# Patient Record
Sex: MALE | Race: WHITE | Employment: OTHER | URBAN - METROPOLITAN AREA
[De-identification: names, ages, dates, MRNs, and addresses within clinical notes are randomized per-mention and may not be internally consistent; named-entity substitution may affect disease eponyms.]

---

## 2017-01-16 LAB
AVERAGE GLUCOSE: NORMAL
HBA1C MFR BLD: 5.9 %

## 2018-05-23 ENCOUNTER — OFFICE VISIT (OUTPATIENT)
Dept: CARDIOLOGY CLINIC | Age: 78
End: 2018-05-23
Payer: MEDICARE

## 2018-05-23 VITALS
HEIGHT: 72 IN | RESPIRATION RATE: 18 BRPM | SYSTOLIC BLOOD PRESSURE: 110 MMHG | DIASTOLIC BLOOD PRESSURE: 70 MMHG | HEART RATE: 48 BPM | BODY MASS INDEX: 26.14 KG/M2 | WEIGHT: 193 LBS

## 2018-05-23 DIAGNOSIS — I25.10 CAD IN NATIVE ARTERY: Primary | Chronic | ICD-10-CM

## 2018-05-23 PROCEDURE — G8417 CALC BMI ABV UP PARAM F/U: HCPCS | Performed by: INTERNAL MEDICINE

## 2018-05-23 PROCEDURE — 4004F PT TOBACCO SCREEN RCVD TLK: CPT | Performed by: INTERNAL MEDICINE

## 2018-05-23 PROCEDURE — 93000 ELECTROCARDIOGRAM COMPLETE: CPT | Performed by: INTERNAL MEDICINE

## 2018-05-23 PROCEDURE — 4040F PNEUMOC VAC/ADMIN/RCVD: CPT | Performed by: INTERNAL MEDICINE

## 2018-05-23 PROCEDURE — G8427 DOCREV CUR MEDS BY ELIG CLIN: HCPCS | Performed by: INTERNAL MEDICINE

## 2018-05-23 PROCEDURE — 1123F ACP DISCUSS/DSCN MKR DOCD: CPT | Performed by: INTERNAL MEDICINE

## 2018-05-23 PROCEDURE — 99214 OFFICE O/P EST MOD 30 MIN: CPT | Performed by: INTERNAL MEDICINE

## 2018-05-23 PROCEDURE — G8599 NO ASA/ANTIPLAT THER USE RNG: HCPCS | Performed by: INTERNAL MEDICINE

## 2018-05-23 RX ORDER — DOCUSATE SODIUM 100 MG/1
100 CAPSULE, LIQUID FILLED ORAL 2 TIMES DAILY
COMMUNITY

## 2018-10-30 LAB
CHOLESTEROL, TOTAL: 134 MG/DL
CHOLESTEROL/HDL RATIO: 2.6
HDLC SERPL-MCNC: 52 MG/DL (ref 35–70)
LDL CHOLESTEROL CALCULATED: 63 MG/DL (ref 0–160)
TRIGL SERPL-MCNC: 103 MG/DL
VLDLC SERPL CALC-MCNC: NORMAL MG/DL

## 2018-12-20 ENCOUNTER — OFFICE VISIT (OUTPATIENT)
Dept: CARDIOLOGY CLINIC | Age: 78
End: 2018-12-20
Payer: MEDICARE

## 2018-12-20 VITALS
WEIGHT: 186.6 LBS | HEIGHT: 72 IN | HEART RATE: 56 BPM | BODY MASS INDEX: 25.27 KG/M2 | SYSTOLIC BLOOD PRESSURE: 124 MMHG | DIASTOLIC BLOOD PRESSURE: 68 MMHG | RESPIRATION RATE: 20 BRPM

## 2018-12-20 DIAGNOSIS — I48.20 CHRONIC ATRIAL FIBRILLATION (HCC): Chronic | ICD-10-CM

## 2018-12-20 DIAGNOSIS — I25.10 CAD IN NATIVE ARTERY: Primary | Chronic | ICD-10-CM

## 2018-12-20 PROCEDURE — G8417 CALC BMI ABV UP PARAM F/U: HCPCS | Performed by: INTERNAL MEDICINE

## 2018-12-20 PROCEDURE — G8427 DOCREV CUR MEDS BY ELIG CLIN: HCPCS | Performed by: INTERNAL MEDICINE

## 2018-12-20 PROCEDURE — 1101F PT FALLS ASSESS-DOCD LE1/YR: CPT | Performed by: INTERNAL MEDICINE

## 2018-12-20 PROCEDURE — G8484 FLU IMMUNIZE NO ADMIN: HCPCS | Performed by: INTERNAL MEDICINE

## 2018-12-20 PROCEDURE — 93000 ELECTROCARDIOGRAM COMPLETE: CPT | Performed by: INTERNAL MEDICINE

## 2018-12-20 PROCEDURE — 1036F TOBACCO NON-USER: CPT | Performed by: INTERNAL MEDICINE

## 2018-12-20 PROCEDURE — G8599 NO ASA/ANTIPLAT THER USE RNG: HCPCS | Performed by: INTERNAL MEDICINE

## 2018-12-20 PROCEDURE — 4040F PNEUMOC VAC/ADMIN/RCVD: CPT | Performed by: INTERNAL MEDICINE

## 2018-12-20 PROCEDURE — 99214 OFFICE O/P EST MOD 30 MIN: CPT | Performed by: INTERNAL MEDICINE

## 2018-12-20 PROCEDURE — 1123F ACP DISCUSS/DSCN MKR DOCD: CPT | Performed by: INTERNAL MEDICINE

## 2018-12-20 NOTE — PROGRESS NOTES
Patient is a 77 y.o. male of [address] [place] [address] seen in follow up. Chief complaint: Hx of CABG    HPI: No SOB. No CP. Patient Active Problem List   Diagnosis    Chronic atrial fibrillation ([address]    CAD in native artery    Anemia    Mixed hyperlipidemia    Psoriasis    Acute respiratory failure with hypoxia (HCC)    Hypertensive urgency, malignant    HTN (hypertension), benign    Nontraumatic hemorrhage of cerebral hemisphere (HCC)       Allergies   Allergen Reactions    Pcn [Penicillins]        Current Outpatient Prescriptions   Medication Sig Dispense Refill    Coenzyme Q10 (CO Q 10) 100 MG CAPS Take by mouth      docusate sodium (RA COL-RITE) 100 MG capsule Take 100 mg by mouth 2 times daily      tamsulosin (FLOMAX) 0.4 MG capsule Take 0.4 mg by mouth daily   0    sertraline (ZOLOFT) 50 MG tablet Take 50 mg by mouth daily   0    hydrALAZINE (APRESOLINE) 10 MG tablet Take 10 mg by mouth 2 times daily   0    levETIRAcetam (KEPPRA) 500 MG tablet Take 500 mg by mouth 2 times daily   0    atorvastatin (LIPITOR) 40 MG tablet 40 mg by Other route Indications: M-W-f only       ferrous sulfate 325 (65 FE) MG tablet Take 325 mg by mouth daily (with breakfast)      amLODIPine (NORVASC) 10 MG tablet 1 tablet by PEG Tube route daily 30 tablet 3     No current facility-administered medications for this visit. Review of systems:   Heart: as above   Lungs: as above   Eyes: denies changes in vision or discharge. Ears: denies changes in hearing or pain. Nose: denies epistaxis or masses   Throat: denies sore throat or trouble swallowing. Neuro: denies numbness, tingling, tremors. Skin: denies rashes or itching. : denies hematuria, dysuria   GI: denies vomiting, diarrhea   Psych: denies mood changed, anxiety, depression.     Physical Exam   /68   Pulse 56   Resp 20   Ht 6' (1.829 m)   Wt 186 lb 9.6 oz (84.6 kg)   BMI 25.31 kg/m²   Constitutional: Oriented to person, place, and time. No distress. Well developed. Head: Normocephalic and atraumatic. Neck: Neck supple. No hepatojugular reflux. No JVD present. Carotid bruit is not present. No tracheal deviation present. No thyromegaly present. Cardiovascular: Normal rate, regular rhythm, TE 2/6  Pulmonary: Breath sounds normal. No respiratory distress. No wheezes. No rales. Chest: Effort normal. No tenderness. Abdominal: Soft. Bowel sounds are normal. No distension or mass. No tenderness, rebound or guarding. Musculoskeletal: . No tenderness. No clubbing or cyanosis. Extremitites: Intact distal pulses. No edema  Neurological: Alert and oriented to person, place, and time. Skin: Skin is warm and dry. No rash noted. Not diaphoretic. No erythema. Psychiatric: Normal mood and affect. Behavior is normal.   Lymphadenopathy: No cervical adenopathy. No groin adenopathy. EKG: Sinus bradycardia. 1st degree AVB    Echo Summary 11/7/17:   Normal left ventricular systolic function.   Ejection fraction is visually estimated at 65%.  Mild concentric left ventricular hypertrophy.   Normal right ventricular size and function (TAPSE 1.8 cm).   Moderately dilated left atrium by volume index.   Mild mitral regurgitation.   Moderate aortic stenosis (AV area 1.4 cm2).  Mild tricuspid regurgitation.   PASP is estimated at 27 mmHg (normal estimated PASP). ASSESSMENT & PLAN:    Patient Active Problem List   Diagnosis    Chronic atrial fibrillation ([address]    CAD in native artery    Anemia    Mixed hyperlipidemia    Psoriasis    Acute respiratory failure with hypoxia (HCC)    Hypertensive urgency, malignant    HTN (hypertension), benign    Nontraumatic hemorrhage of cerebral hemisphere ([address]     1. CAD/Post CABG:   Cath revealing of of multivessel CAD. Underwent CABG 5/14. Continue atorvastatin. No BB due to rest bradycardia. No ASA due to GIB and hemorrhagic stroke history. Consider restarting ASA.      2. Afib/Bradycardia:

## 2019-02-07 LAB
CREATININE: 1.8 MG/DL
POTASSIUM (K+): 4.6

## 2019-05-13 RX ORDER — TAMSULOSIN HYDROCHLORIDE 0.4 MG/1
CAPSULE ORAL
Qty: 180 CAPSULE | Refills: 1 | OUTPATIENT
Start: 2019-05-13

## 2019-05-14 VITALS
HEIGHT: 72 IN | OXYGEN SATURATION: 98 % | WEIGHT: 185.5 LBS | HEART RATE: 57 BPM | SYSTOLIC BLOOD PRESSURE: 138 MMHG | BODY MASS INDEX: 25.12 KG/M2 | DIASTOLIC BLOOD PRESSURE: 76 MMHG

## 2019-05-14 RX ORDER — AMPICILLIN TRIHYDRATE 250 MG
600 CAPSULE ORAL 2 TIMES DAILY
COMMUNITY
End: 2019-09-10

## 2019-05-14 ASSESSMENT — ENCOUNTER SYMPTOMS
ABDOMINAL PAIN: 0
ALLERGIC/IMMUNOLOGIC NEGATIVE: 1
SHORTNESS OF BREATH: 0
CHEST TIGHTNESS: 0

## 2019-05-14 NOTE — PROGRESS NOTES
05/15/19     West YANCEY Shirazenhozoran     1940     77 y.o. Chief Complaint   Patient presents with    Coronary Artery Disease    Hyperlipidemia    Hypertension        Hypertension Compliance Reviewed. No medication side effects noted. Denies associated dyspnea, edema, fatigue, orhtopenia, chest pain and palpitations. Pt continues to have sensitive skin right side of the body and poor balance. Pt states sx improve after working out. Review of Systems   Constitutional: Negative for activity change and appetite change. HENT: Negative for congestion. Eyes: Negative for visual disturbance. Respiratory: Negative for chest tightness and shortness of breath. Cardiovascular: Negative for chest pain, palpitations and leg swelling. Gastrointestinal: Negative for abdominal pain. Endocrine: Negative for cold intolerance and heat intolerance. Genitourinary: Negative for difficulty urinating. Musculoskeletal: Negative for arthralgias. Skin: Negative. Negative for rash. Allergic/Immunologic: Negative. Neurological: Negative for dizziness, syncope and light-headedness. Rt sided weaknes ,lack of coordination   Hematological: Negative. Negative for adenopathy. Psychiatric/Behavioral: Negative. No problem-specific Assessment & Plan notes found for this encounter.        Current Outpatient Medications   Medication Sig Dispense Refill    atorvastatin (LIPITOR) 40 MG tablet Take 1 tablet by mouth daily Indications: M-W-f only 90 tablet 1    amLODIPine (NORVASC) 10 MG tablet 1 tablet by PEG Tube route daily 90 tablet 1    hydrALAZINE (APRESOLINE) 10 MG tablet Take 1 tablet by mouth 2 times daily 180 tablet 1    sertraline (ZOLOFT) 50 MG tablet Take 1 tablet by mouth daily 90 tablet 1    Red Yeast Rice 600 MG CAPS Take 600 mg by mouth 2 times daily      Coenzyme Q10 (CO Q 10) 100 MG CAPS Take by mouth      docusate sodium (RA COL-RITE) 100 MG capsule Take 100 mg by mouth 2 times daily      tamsulosin (FLOMAX) 0.4 MG capsule Take 0.4 mg by mouth daily   0    levETIRAcetam (KEPPRA) 500 MG tablet Take 500 mg by mouth 2 times daily   0    ferrous sulfate 325 (65 FE) MG tablet Take 325 mg by mouth daily (with breakfast)       No current facility-administered medications for this visit. Allergies   Allergen Reactions    Pcn [Penicillins]         Social History     Socioeconomic History    Marital status:      Spouse name: Not on file    Number of children: Not on file    Years of education: Not on file    Highest education level: Not on file   Occupational History    Not on file   Social Needs    Financial resource strain: Not on file    Food insecurity:     Worry: Not on file     Inability: Not on file    Transportation needs:     Medical: Not on file     Non-medical: Not on file   Tobacco Use    Smoking status: Former Smoker     Packs/day: 0.10     Types: Cigarettes     Last attempt to quit: 2014     Years since quittin.7    Smokeless tobacco: Former User     Types: Chew     Quit date:     Tobacco comment: pt states smokes one cigarette per day; attempting to quit   Substance and Sexual Activity    Alcohol use:  Yes     Alcohol/week: 8.4 oz     Types: 14 Glasses of wine per week    Drug use: No    Sexual activity: Not on file   Lifestyle    Physical activity:     Days per week: Not on file     Minutes per session: Not on file    Stress: Not on file   Relationships    Social connections:     Talks on phone: Not on file     Gets together: Not on file     Attends Buddhist service: Not on file     Active member of club or organization: Not on file     Attends meetings of clubs or organizations: Not on file     Relationship status: Not on file    Intimate partner violence:     Fear of current or ex partner: Not on file     Emotionally abused: Not on file     Physically abused: Not on file     Forced sexual activity: Not on file   Other Topics Concern    Not on file   Social History Narrative    Not on file        Past Surgical History:   Procedure Laterality Date    CORONARY ARTERY BYPASS GRAFT  2014    X3    HERNIA REPAIR      OTHER SURGICAL HISTORY  08/10/2016    trach and peg insertion    SHOULDER SURGERY      TONSILLECTOMY          Physical Exam   Constitutional: He is oriented to person, place, and time. He appears well-developed and well-nourished. HENT:   Head: Normocephalic. Right Ear: External ear normal.   Left Ear: External ear normal.   Nose: Nose normal.   Mouth/Throat: Oropharynx is clear and moist.   Eyes: Pupils are equal, round, and reactive to light. Conjunctivae and EOM are normal.   Neck: Normal range of motion. Neck supple. No thyromegaly present. Cardiovascular: Normal rate, regular rhythm, normal heart sounds and intact distal pulses. Pulmonary/Chest: Effort normal and breath sounds normal.   Abdominal: Soft. Bowel sounds are normal. He exhibits no mass. There is no tenderness. Musculoskeletal: Normal range of motion. Rt sided hemiplegia   Lymphadenopathy:     He has no cervical adenopathy. Neurological: He is alert and oriented to person, place, and time. A sensory deficit is present. No cranial nerve deficit. Coordination abnormal.   Right sided weakness   Lack of coordination    Skin: Skin is warm and dry. No rash noted. Psychiatric: He has a normal mood and affect. Burke Decker was seen today for coronary artery disease, hyperlipidemia and hypertension. Diagnoses and all orders for this visit:    Essential hypertension  Comments:  stable   Orders:  -     amLODIPine (NORVASC) 10 MG tablet; 1 tablet by PEG Tube route daily  -     sertraline (ZOLOFT) 50 MG tablet; Take 1 tablet by mouth daily  -     Basic Metabolic Panel; Future    Hyperlipidemia, unspecified hyperlipidemia type  Comments:  due for lipid today   Orders:  -     atorvastatin (LIPITOR) 40 MG tablet;  Take 1 tablet by mouth daily

## 2019-05-15 ENCOUNTER — OFFICE VISIT (OUTPATIENT)
Dept: FAMILY MEDICINE CLINIC | Age: 79
End: 2019-05-15
Payer: MEDICARE

## 2019-05-15 VITALS
HEART RATE: 78 BPM | RESPIRATION RATE: 20 BRPM | WEIGHT: 186 LBS | BODY MASS INDEX: 25.19 KG/M2 | HEIGHT: 72 IN | DIASTOLIC BLOOD PRESSURE: 70 MMHG | SYSTOLIC BLOOD PRESSURE: 136 MMHG | OXYGEN SATURATION: 98 %

## 2019-05-15 DIAGNOSIS — I69.951 HEMIPLEGIA OF RIGHT DOMINANT SIDE AS LATE EFFECT OF CEREBROVASCULAR DISEASE, UNSPECIFIED CEREBROVASCULAR DISEASE TYPE, UNSPECIFIED HEMIPLEGIA TYPE (HCC): ICD-10-CM

## 2019-05-15 DIAGNOSIS — I61.9 NONTRAUMATIC INTRACEREBRAL HEMORRHAGE, UNSPECIFIED CEREBRAL LOCATION, UNSPECIFIED LATERALITY (HCC): ICD-10-CM

## 2019-05-15 DIAGNOSIS — E78.5 HYPERLIPIDEMIA, UNSPECIFIED HYPERLIPIDEMIA TYPE: ICD-10-CM

## 2019-05-15 DIAGNOSIS — I25.10 CORONARY ARTERY DISEASE INVOLVING NATIVE CORONARY ARTERY OF NATIVE HEART WITHOUT ANGINA PECTORIS: ICD-10-CM

## 2019-05-15 DIAGNOSIS — I10 ESSENTIAL HYPERTENSION: Primary | ICD-10-CM

## 2019-05-15 PROCEDURE — 99214 OFFICE O/P EST MOD 30 MIN: CPT | Performed by: FAMILY MEDICINE

## 2019-05-15 RX ORDER — HYDRALAZINE HYDROCHLORIDE 10 MG/1
10 TABLET, FILM COATED ORAL 2 TIMES DAILY
Qty: 180 TABLET | Refills: 1 | Status: SHIPPED | OUTPATIENT
Start: 2019-05-15 | End: 2019-09-19 | Stop reason: SDUPTHER

## 2019-05-15 RX ORDER — AMLODIPINE BESYLATE 10 MG/1
10 TABLET ORAL DAILY
Qty: 90 TABLET | Refills: 1 | Status: SHIPPED | OUTPATIENT
Start: 2019-05-15 | End: 2019-09-19 | Stop reason: SDUPTHER

## 2019-05-15 RX ORDER — ATORVASTATIN CALCIUM 40 MG/1
40 TABLET, FILM COATED ORAL DAILY
Qty: 90 TABLET | Refills: 1 | Status: SHIPPED | OUTPATIENT
Start: 2019-05-15 | End: 2019-09-19 | Stop reason: SDUPTHER

## 2019-05-15 ASSESSMENT — PATIENT HEALTH QUESTIONNAIRE - PHQ9
SUM OF ALL RESPONSES TO PHQ QUESTIONS 1-9: 0
SUM OF ALL RESPONSES TO PHQ9 QUESTIONS 1 & 2: 0
1. LITTLE INTEREST OR PLEASURE IN DOING THINGS: 0
SUM OF ALL RESPONSES TO PHQ QUESTIONS 1-9: 0
2. FEELING DOWN, DEPRESSED OR HOPELESS: 0

## 2019-05-16 ENCOUNTER — HOSPITAL ENCOUNTER (OUTPATIENT)
Age: 79
Discharge: HOME | End: 2019-05-18
Payer: MEDICARE

## 2019-05-16 DIAGNOSIS — E78.5 HYPERLIPIDEMIA, UNSPECIFIED HYPERLIPIDEMIA TYPE: ICD-10-CM

## 2019-05-16 DIAGNOSIS — I10 ESSENTIAL HYPERTENSION: ICD-10-CM

## 2019-05-16 LAB
ANION GAP SERPL CALCULATED.3IONS-SCNC: 17 MMOL/L (ref 7–16)
BUN BLDV-MCNC: 26 MG/DL (ref 8–23)
CALCIUM SERPL-MCNC: 9.3 MG/DL (ref 8.6–10.2)
CHLORIDE BLD-SCNC: 105 MMOL/L (ref 98–107)
CHOLESTEROL, TOTAL: 153 MG/DL (ref 0–199)
CO2: 19 MMOL/L (ref 22–29)
CREAT SERPL-MCNC: 1.8 MG/DL (ref 0.7–1.2)
GFR AFRICAN AMERICAN: 44
GFR NON-AFRICAN AMERICAN: 36 ML/MIN/1.73
GLUCOSE BLD-MCNC: 95 MG/DL (ref 74–99)
HDLC SERPL-MCNC: 50 MG/DL
LDL CHOLESTEROL CALCULATED: 79 MG/DL (ref 0–99)
POTASSIUM SERPL-SCNC: 4.6 MMOL/L (ref 3.5–5)
SODIUM BLD-SCNC: 141 MMOL/L (ref 132–146)
TRIGL SERPL-MCNC: 118 MG/DL (ref 0–149)
VLDLC SERPL CALC-MCNC: 24 MG/DL

## 2019-05-16 PROCEDURE — 80048 BASIC METABOLIC PNL TOTAL CA: CPT

## 2019-05-16 PROCEDURE — 36415 COLL VENOUS BLD VENIPUNCTURE: CPT

## 2019-05-16 PROCEDURE — 80061 LIPID PANEL: CPT

## 2019-05-20 RX ORDER — TAMSULOSIN HYDROCHLORIDE 0.4 MG/1
CAPSULE ORAL
Qty: 180 CAPSULE | Refills: 1 | OUTPATIENT
Start: 2019-05-20

## 2019-05-23 RX ORDER — TAMSULOSIN HYDROCHLORIDE 0.4 MG/1
CAPSULE ORAL
Qty: 180 CAPSULE | Refills: 1 | Status: SHIPPED | OUTPATIENT
Start: 2019-05-23 | End: 2019-09-19 | Stop reason: SDUPTHER

## 2019-05-24 NOTE — TELEPHONE ENCOUNTER
PT stopped at [facility] front today, hasn't been able to get through on phone. Needs refill of Tamsulosin HCL 0.4 mg 2 capsules daily.   [phone] for wife Chilango

## 2019-06-10 RX ORDER — LEVETIRACETAM 500 MG/1
TABLET ORAL
Qty: 180 TABLET | Refills: 1 | OUTPATIENT
Start: 2019-06-10

## 2019-06-10 RX ORDER — LEVETIRACETAM 500 MG/1
500 TABLET ORAL 2 TIMES DAILY
Qty: 60 TABLET | Refills: 4 | Status: SHIPPED | OUTPATIENT
Start: 2019-06-10 | End: 2019-09-19 | Stop reason: SDUPTHER

## 2019-09-10 ENCOUNTER — OFFICE VISIT (OUTPATIENT)
Dept: CARDIOLOGY CLINIC | Age: 79
End: 2019-09-10
Payer: MEDICARE

## 2019-09-10 VITALS
RESPIRATION RATE: 16 BRPM | HEART RATE: 49 BPM | BODY MASS INDEX: 25.63 KG/M2 | WEIGHT: 189.2 LBS | DIASTOLIC BLOOD PRESSURE: 78 MMHG | SYSTOLIC BLOOD PRESSURE: 122 MMHG | HEIGHT: 72 IN

## 2019-09-10 DIAGNOSIS — I48.20 CHRONIC ATRIAL FIBRILLATION (HCC): Primary | Chronic | ICD-10-CM

## 2019-09-10 PROCEDURE — G8417 CALC BMI ABV UP PARAM F/U: HCPCS | Performed by: INTERNAL MEDICINE

## 2019-09-10 PROCEDURE — 1036F TOBACCO NON-USER: CPT | Performed by: INTERNAL MEDICINE

## 2019-09-10 PROCEDURE — 99214 OFFICE O/P EST MOD 30 MIN: CPT | Performed by: INTERNAL MEDICINE

## 2019-09-10 PROCEDURE — G8427 DOCREV CUR MEDS BY ELIG CLIN: HCPCS | Performed by: INTERNAL MEDICINE

## 2019-09-10 PROCEDURE — 4040F PNEUMOC VAC/ADMIN/RCVD: CPT | Performed by: INTERNAL MEDICINE

## 2019-09-10 PROCEDURE — 93000 ELECTROCARDIOGRAM COMPLETE: CPT | Performed by: INTERNAL MEDICINE

## 2019-09-10 PROCEDURE — G8599 NO ASA/ANTIPLAT THER USE RNG: HCPCS | Performed by: INTERNAL MEDICINE

## 2019-09-10 PROCEDURE — 1123F ACP DISCUSS/DSCN MKR DOCD: CPT | Performed by: INTERNAL MEDICINE

## 2019-09-10 RX ORDER — VIT C/VIT E/LUTEIN/MIN/OMEGA-3 100-15-2
CAPSULE ORAL DAILY
COMMUNITY

## 2019-09-17 ASSESSMENT — ENCOUNTER SYMPTOMS
ABDOMINAL PAIN: 0
BLOOD IN STOOL: 0
SHORTNESS OF BREATH: 0
COUGH: 0

## 2019-09-17 NOTE — PROGRESS NOTES
a day, Disp: 180 capsule, Rfl: 1    Multiple Vitamins-Minerals (OCUVITE ADULT FORMULA) CAPS, Take by mouth daily, Disp: , Rfl:     Coenzyme Q10 (CO Q 10) 100 MG CAPS, Take by mouth, Disp: , Rfl:     docusate sodium (RA COL-RITE) 100 MG capsule, Take 100 mg by mouth 2 times daily, Disp: , Rfl:     ferrous sulfate 325 (65 FE) MG tablet, Take 325 mg by mouth daily (with breakfast), Disp: , Rfl:     betamethasone dipropionate (DIPROLENE) 0.05 % cream, Apply to affected area topically 2 times daily. , Disp: 1 Tube, Rfl: 2    terbinafine (LAMISIL) 1 % cream, Apply affected area topically 2 times daily. , Disp: 1 Tube, Rfl: 2  Allergies   Allergen Reactions    Pcn [Penicillins]        Past Medical History:   Diagnosis Date    Atrial fibrillation ([address]     Blood transfusion reaction     past week(2014)    Hyperlipidemia     Hypertension     Stroke ([facility] 2016     Past Surgical History:   Procedure Laterality Date    CORONARY ARTERY BYPASS GRAFT  2014    X3    HERNIA REPAIR      OTHER SURGICAL HISTORY  08/10/2016    trach and peg insertion    SHOULDER SURGERY      TONSILLECTOMY       Family History   Problem Relation Age of Onset    Cancer Father      Social History     Socioeconomic History    Marital status:      Spouse name: Not on file    Number of children: Not on file    Years of education: Not on file    Highest education level: Not on file   Occupational History    Not on file   Social Needs    Financial resource strain: Not on file    Food insecurity:     Worry: Not on file     Inability: Not on file    Transportation needs:     Medical: Not on file     Non-medical: Not on file   Tobacco Use    Smoking status: Former Smoker     Packs/day: 0.10     Years: 57.00     Pack years: 5.70     Types: Cigarettes     Start date:      Last attempt to quit: 2014     Years since quittin.1    Smokeless tobacco: Former User     Types: Chew     Quit date:    Substance and Sexual Activity    Alcohol use: Yes     Alcohol/week: 14.0 standard drinks     Types: 14 Glasses of wine per week    Drug use: No    Sexual activity: Not on file   Lifestyle    Physical activity:     Days per week: Not on file     Minutes per session: Not on file    Stress: Not on file   Relationships    Social connections:     Talks on phone: Not on file     Gets together: Not on file     Attends Caodaism service: Not on file     Active member of club or organization: Not on file     Attends meetings of clubs or organizations: Not on file     Relationship status: Not on file    Intimate partner violence:     Fear of current or ex partner: Not on file     Emotionally abused: Not on file     Physically abused: Not on file     Forced sexual activity: Not on file   Other Topics Concern    Not on file   Social History Narrative    Not on file       Vitals:    09/19/19 1520   BP: 130/60   Pulse: 55   Resp: 18   Temp: 97.7 °F (36.5 °C)   TempSrc: Temporal   SpO2: 98%   Weight: 188 lb (85.3 kg)   Height: 6' (1.829 m)               Physical Exam   HENT:   Left Ear: Tympanic membrane and ear canal normal.   Mouth/Throat: Oropharynx is clear and moist.   Rt eac blocked   Cardiovascular: Normal rate, regular rhythm and normal heart sounds. Pulmonary/Chest: Effort normal and breath sounds normal.   Abdominal: Soft. There is no tenderness. Musculoskeletal:   Rt hemiplegia   Neurological:   Rt hemiplegia   Skin: Rash (erythematous rash both feet rt >left) noted. Some maceration of tissue between the digits               Assessment and Plan:  Go Pierson was seen today for hypertension and hyperlipidemia. Diagnoses and all orders for this visit:    Hyperlipidemia, unspecified hyperlipidemia type  Comments:  -stable  Orders:  -     atorvastatin (LIPITOR) 40 MG tablet; Take 1 tablet by mouth daily Indications: M-W-f only  -     CBC Auto Differential; Future  -     Lipid Panel;  Future    Essential

## 2019-09-19 ENCOUNTER — OFFICE VISIT (OUTPATIENT)
Dept: FAMILY MEDICINE CLINIC | Age: 79
End: 2019-09-19
Payer: MEDICARE

## 2019-09-19 ENCOUNTER — OFFICE VISIT (OUTPATIENT)
Dept: PODIATRY | Age: 79
End: 2019-09-19
Payer: MEDICARE

## 2019-09-19 VITALS
TEMPERATURE: 97.7 F | HEART RATE: 55 BPM | RESPIRATION RATE: 18 BRPM | WEIGHT: 188 LBS | OXYGEN SATURATION: 98 % | DIASTOLIC BLOOD PRESSURE: 60 MMHG | HEIGHT: 72 IN | SYSTOLIC BLOOD PRESSURE: 130 MMHG | BODY MASS INDEX: 25.47 KG/M2

## 2019-09-19 VITALS
BODY MASS INDEX: 25.47 KG/M2 | WEIGHT: 188 LBS | HEIGHT: 72 IN | RESPIRATION RATE: 18 BRPM | TEMPERATURE: 97.7 F | SYSTOLIC BLOOD PRESSURE: 130 MMHG | DIASTOLIC BLOOD PRESSURE: 60 MMHG | OXYGEN SATURATION: 98 % | HEART RATE: 55 BPM

## 2019-09-19 DIAGNOSIS — E78.5 HYPERLIPIDEMIA, UNSPECIFIED HYPERLIPIDEMIA TYPE: Primary | ICD-10-CM

## 2019-09-19 DIAGNOSIS — L85.3 XEROSIS CUTIS: ICD-10-CM

## 2019-09-19 DIAGNOSIS — B35.3 TINEA PEDIS OF BOTH FEET: ICD-10-CM

## 2019-09-19 DIAGNOSIS — I10 ESSENTIAL HYPERTENSION: ICD-10-CM

## 2019-09-19 DIAGNOSIS — B35.3 TINEA PEDIS OF BOTH FEET: Primary | ICD-10-CM

## 2019-09-19 DIAGNOSIS — H61.23 BILATERAL IMPACTED CERUMEN: ICD-10-CM

## 2019-09-19 DIAGNOSIS — G81.91 RIGHT HEMIPLEGIA (HCC): ICD-10-CM

## 2019-09-19 DIAGNOSIS — F32.A DEPRESSION, UNSPECIFIED DEPRESSION TYPE: ICD-10-CM

## 2019-09-19 DIAGNOSIS — L30.9 DERMATITIS OF LOWER EXTREMITY: ICD-10-CM

## 2019-09-19 DIAGNOSIS — Z12.5 SCREENING FOR PROSTATE CANCER: ICD-10-CM

## 2019-09-19 DIAGNOSIS — I63.9 CEREBROVASCULAR ACCIDENT (CVA), UNSPECIFIED MECHANISM (HCC): ICD-10-CM

## 2019-09-19 PROCEDURE — 4040F PNEUMOC VAC/ADMIN/RCVD: CPT | Performed by: PODIATRIST

## 2019-09-19 PROCEDURE — G8417 CALC BMI ABV UP PARAM F/U: HCPCS | Performed by: FAMILY MEDICINE

## 2019-09-19 PROCEDURE — G8428 CUR MEDS NOT DOCUMENT: HCPCS | Performed by: PODIATRIST

## 2019-09-19 PROCEDURE — 1123F ACP DISCUSS/DSCN MKR DOCD: CPT | Performed by: FAMILY MEDICINE

## 2019-09-19 PROCEDURE — 69210 REMOVE IMPACTED EAR WAX UNI: CPT | Performed by: FAMILY MEDICINE

## 2019-09-19 PROCEDURE — G8417 CALC BMI ABV UP PARAM F/U: HCPCS | Performed by: PODIATRIST

## 2019-09-19 PROCEDURE — 1036F TOBACCO NON-USER: CPT | Performed by: FAMILY MEDICINE

## 2019-09-19 PROCEDURE — 99214 OFFICE O/P EST MOD 30 MIN: CPT | Performed by: FAMILY MEDICINE

## 2019-09-19 PROCEDURE — 4040F PNEUMOC VAC/ADMIN/RCVD: CPT | Performed by: FAMILY MEDICINE

## 2019-09-19 PROCEDURE — G8599 NO ASA/ANTIPLAT THER USE RNG: HCPCS | Performed by: PODIATRIST

## 2019-09-19 PROCEDURE — G8427 DOCREV CUR MEDS BY ELIG CLIN: HCPCS | Performed by: FAMILY MEDICINE

## 2019-09-19 PROCEDURE — 1036F TOBACCO NON-USER: CPT | Performed by: PODIATRIST

## 2019-09-19 PROCEDURE — 99203 OFFICE O/P NEW LOW 30 MIN: CPT | Performed by: PODIATRIST

## 2019-09-19 PROCEDURE — 1123F ACP DISCUSS/DSCN MKR DOCD: CPT | Performed by: PODIATRIST

## 2019-09-19 PROCEDURE — G8599 NO ASA/ANTIPLAT THER USE RNG: HCPCS | Performed by: FAMILY MEDICINE

## 2019-09-19 RX ORDER — LEVETIRACETAM 500 MG/1
500 TABLET ORAL 2 TIMES DAILY
Qty: 60 TABLET | Refills: 4 | Status: SHIPPED
Start: 2019-09-19 | End: 2020-03-09 | Stop reason: SDUPTHER

## 2019-09-19 RX ORDER — TAMSULOSIN HYDROCHLORIDE 0.4 MG/1
CAPSULE ORAL
Qty: 180 CAPSULE | Refills: 1 | Status: SHIPPED
Start: 2019-09-19 | End: 2020-03-09 | Stop reason: SDUPTHER

## 2019-09-19 RX ORDER — AMLODIPINE BESYLATE 10 MG/1
10 TABLET ORAL DAILY
Qty: 90 TABLET | Refills: 1 | Status: SHIPPED
Start: 2019-09-19 | End: 2020-03-09 | Stop reason: SDUPTHER

## 2019-09-19 RX ORDER — BETAMETHASONE DIPROPIONATE 0.5 MG/G
CREAM TOPICAL
Qty: 1 TUBE | Refills: 2 | Status: SHIPPED | OUTPATIENT
Start: 2019-09-19 | End: 2019-11-26 | Stop reason: SDUPTHER

## 2019-09-19 RX ORDER — HYDRALAZINE HYDROCHLORIDE 10 MG/1
10 TABLET, FILM COATED ORAL 2 TIMES DAILY
Qty: 180 TABLET | Refills: 1 | Status: SHIPPED
Start: 2019-09-19 | End: 2020-03-09 | Stop reason: SDUPTHER

## 2019-09-19 RX ORDER — ATORVASTATIN CALCIUM 40 MG/1
40 TABLET, FILM COATED ORAL DAILY
Qty: 90 TABLET | Refills: 1 | Status: SHIPPED
Start: 2019-09-19 | End: 2020-03-09 | Stop reason: SDUPTHER

## 2019-09-19 NOTE — PROGRESS NOTES
New patient in office for redness/ rash bilat feet. Referred by PCP Dr Leonel Menezes seen in office today 19.     19  Kamila Dotson : 1940 Sex: male  Age: [de-identified] y.o. Patient was referred by Yareli Narayan DO    CC:    Redness and itching bilateral lower legs    HPI:   This pleasant 69-year-old male patient is referred to me from Dr. Chester Melara today for redness and itching bilateral lower legs. Patient does present with his wife today. States for the past several weeks he has noticed increased redness and itching. Patient's wife does state he has had a similar redness and itching issue multiple times in the past.  Denies any recent formal treatment or therapy. Denies any current use of any topical lotion. Denies any open wounds or any drainage. Denies being a diabetic. No additional pedal complaints at this time. ROS:  Const: Denies constitutional symptoms  Musculo: Denies symptoms other than stated above  Skin: Denies symptoms other than stated above       Current Outpatient Medications:     betamethasone dipropionate (DIPROLENE) 0.05 % cream, Apply to affected area topically 2 times daily. , Disp: 1 Tube, Rfl: 2    terbinafine (LAMISIL) 1 % cream, Apply affected area topically 2 times daily. , Disp: 1 Tube, Rfl: 2    amLODIPine (NORVASC) 10 MG tablet, 1 tablet by PEG Tube route daily, Disp: 90 tablet, Rfl: 1    atorvastatin (LIPITOR) 40 MG tablet, Take 1 tablet by mouth daily Indications: M-W-f only, Disp: 90 tablet, Rfl: 1    hydrALAZINE (APRESOLINE) 10 MG tablet, Take 1 tablet by mouth 2 times daily, Disp: 180 tablet, Rfl: 1    levETIRAcetam (KEPPRA) 500 MG tablet, Take 1 tablet by mouth 2 times daily, Disp: 60 tablet, Rfl: 4    sertraline (ZOLOFT) 50 MG tablet, Take 1 tablet by mouth daily, Disp: 90 tablet, Rfl: 1    tamsulosin (FLOMAX) 0.4 MG capsule, take 1 capsule by mouth twice a day, Disp: 180 capsule, Rfl: 1    Multiple Vitamins-Minerals (OCUVITE ADULT

## 2019-10-03 ENCOUNTER — OFFICE VISIT (OUTPATIENT)
Dept: PODIATRY | Age: 79
End: 2019-10-03
Payer: MEDICARE

## 2019-10-03 VITALS — HEART RATE: 67 BPM | TEMPERATURE: 97.1 F | OXYGEN SATURATION: 95 %

## 2019-10-03 DIAGNOSIS — B35.1 TINEA UNGUIUM: ICD-10-CM

## 2019-10-03 DIAGNOSIS — G60.8 HEREDITARY SENSORY NEUROPATHY: ICD-10-CM

## 2019-10-03 DIAGNOSIS — B35.3 TINEA PEDIS OF BOTH FEET: Primary | ICD-10-CM

## 2019-10-03 DIAGNOSIS — L85.3 XEROSIS CUTIS: ICD-10-CM

## 2019-10-03 DIAGNOSIS — L84 CORNS AND CALLOSITIES: ICD-10-CM

## 2019-10-03 PROCEDURE — 11721 DEBRIDE NAIL 6 OR MORE: CPT | Performed by: PODIATRIST

## 2019-10-03 PROCEDURE — 11057 PARNG/CUTG B9 HYPRKR LES >4: CPT | Performed by: PODIATRIST

## 2019-10-03 PROCEDURE — 99999 PR OFFICE/OUTPT VISIT,PROCEDURE ONLY: CPT | Performed by: PODIATRIST

## 2019-11-21 ENCOUNTER — TELEPHONE (OUTPATIENT)
Dept: FAMILY MEDICINE CLINIC | Age: 79
End: 2019-11-21

## 2019-11-26 RX ORDER — BETAMETHASONE DIPROPIONATE 0.5 MG/G
CREAM TOPICAL
Qty: 1 TUBE | Refills: 2 | Status: SHIPPED | OUTPATIENT
Start: 2019-11-26

## 2019-12-05 ENCOUNTER — PROCEDURE VISIT (OUTPATIENT)
Dept: PODIATRY | Age: 79
End: 2019-12-05
Payer: MEDICARE

## 2019-12-05 DIAGNOSIS — B35.1 TINEA UNGUIUM: Primary | ICD-10-CM

## 2019-12-05 DIAGNOSIS — G60.8 HEREDITARY SENSORY NEUROPATHY: ICD-10-CM

## 2019-12-05 DIAGNOSIS — L84 CORNS AND CALLOSITIES: ICD-10-CM

## 2019-12-05 DIAGNOSIS — L85.3 XEROSIS CUTIS: ICD-10-CM

## 2019-12-05 DIAGNOSIS — B35.3 TINEA PEDIS OF BOTH FEET: ICD-10-CM

## 2019-12-05 PROCEDURE — 11721 DEBRIDE NAIL 6 OR MORE: CPT | Performed by: PODIATRIST

## 2019-12-05 PROCEDURE — 11057 PARNG/CUTG B9 HYPRKR LES >4: CPT | Performed by: PODIATRIST

## 2020-02-06 ENCOUNTER — PROCEDURE VISIT (OUTPATIENT)
Dept: PODIATRY | Age: 80
End: 2020-02-06
Payer: MEDICARE

## 2020-02-06 PROCEDURE — 11721 DEBRIDE NAIL 6 OR MORE: CPT | Performed by: PODIATRIST

## 2020-02-06 PROCEDURE — 11057 PARNG/CUTG B9 HYPRKR LES >4: CPT | Performed by: PODIATRIST

## 2020-02-06 RX ORDER — PRENATAL VIT 91/IRON/FOLIC/DHA 28-975-200
COMBINATION PACKAGE (EA) ORAL
Qty: 1 TUBE | Refills: 2 | Status: SHIPPED
Start: 2020-02-06 | End: 2020-03-09 | Stop reason: SDUPTHER

## 2020-02-06 RX ORDER — BETAMETHASONE DIPROPIONATE 0.5 MG/G
CREAM TOPICAL
Qty: 1 TUBE | Refills: 2 | Status: SHIPPED
Start: 2020-02-06 | End: 2020-03-09 | Stop reason: SDUPTHER

## 2020-02-06 NOTE — PROGRESS NOTES
extremity    Other orders  -     terbinafine (LAMISIL) 1 % cream; Apply affected area topically 2 times daily. -     betamethasone dipropionate (DIPROLENE) 0.05 % cream; Apply to affected area topically 2 times daily. Manual debridement with standard technique toenails 1 through 5 right and left in thickness and length. Patient tolerated procedure well. Paring of hyperkeratotic tissue with #15 blade plantar first, third and fifth metatarsal heads bilateral  I did send a refill of betamethasone 0.05% and topical Lamisil 1% to be applied once daily bilateral lower legs. Has responded very well to topical treatment. Patient is going to [place] for the next several weeks. I will follow-up in 2 months. Return in about 2 months (around 4/6/2020). Seen By:  Belem Lopez DPM      Document was created using voice recognition software. Note was reviewed, however may contain grammatical errors.

## 2020-03-09 ENCOUNTER — OFFICE VISIT (OUTPATIENT)
Dept: FAMILY MEDICINE CLINIC | Age: 80
End: 2020-03-09
Payer: MEDICARE

## 2020-03-09 VITALS
OXYGEN SATURATION: 98 % | DIASTOLIC BLOOD PRESSURE: 60 MMHG | RESPIRATION RATE: 20 BRPM | TEMPERATURE: 97.7 F | HEART RATE: 54 BPM | WEIGHT: 185.6 LBS | SYSTOLIC BLOOD PRESSURE: 138 MMHG | BODY MASS INDEX: 25.17 KG/M2

## 2020-03-09 PROCEDURE — G8484 FLU IMMUNIZE NO ADMIN: HCPCS | Performed by: FAMILY MEDICINE

## 2020-03-09 PROCEDURE — 1123F ACP DISCUSS/DSCN MKR DOCD: CPT | Performed by: FAMILY MEDICINE

## 2020-03-09 PROCEDURE — 4040F PNEUMOC VAC/ADMIN/RCVD: CPT | Performed by: FAMILY MEDICINE

## 2020-03-09 PROCEDURE — 99214 OFFICE O/P EST MOD 30 MIN: CPT | Performed by: FAMILY MEDICINE

## 2020-03-09 PROCEDURE — G8427 DOCREV CUR MEDS BY ELIG CLIN: HCPCS | Performed by: FAMILY MEDICINE

## 2020-03-09 PROCEDURE — 1036F TOBACCO NON-USER: CPT | Performed by: FAMILY MEDICINE

## 2020-03-09 PROCEDURE — G8417 CALC BMI ABV UP PARAM F/U: HCPCS | Performed by: FAMILY MEDICINE

## 2020-03-09 RX ORDER — LEVETIRACETAM 500 MG/1
500 TABLET ORAL 2 TIMES DAILY
Qty: 60 TABLET | Refills: 4 | Status: SHIPPED | OUTPATIENT
Start: 2020-03-09

## 2020-03-09 RX ORDER — ATORVASTATIN CALCIUM 40 MG/1
40 TABLET, FILM COATED ORAL DAILY
Qty: 90 TABLET | Refills: 1 | Status: SHIPPED | OUTPATIENT
Start: 2020-03-09

## 2020-03-09 RX ORDER — TAMSULOSIN HYDROCHLORIDE 0.4 MG/1
CAPSULE ORAL
Qty: 180 CAPSULE | Refills: 1 | Status: SHIPPED | OUTPATIENT
Start: 2020-03-09

## 2020-03-09 RX ORDER — HYDRALAZINE HYDROCHLORIDE 10 MG/1
10 TABLET, FILM COATED ORAL 2 TIMES DAILY
Qty: 180 TABLET | Refills: 1 | Status: SHIPPED | OUTPATIENT
Start: 2020-03-09

## 2020-03-09 RX ORDER — AMLODIPINE BESYLATE 10 MG/1
10 TABLET ORAL DAILY
Qty: 90 TABLET | Refills: 1 | Status: SHIPPED | OUTPATIENT
Start: 2020-03-09

## 2020-03-09 ASSESSMENT — ENCOUNTER SYMPTOMS
SHORTNESS OF BREATH: 0
CHEST TIGHTNESS: 0
ALLERGIC/IMMUNOLOGIC NEGATIVE: 1
ABDOMINAL PAIN: 0

## 2020-03-09 NOTE — PROGRESS NOTES
3/9/2020  Chief Complaint   Patient presents with    Hypertension    Hyperlipidemia    Depression       HPI     Patient presents for follow up on hypertension and hyperlipidemia. Patient continues to follow with podiatry. Depression is stable with sertraline. No suicidal ideation. Patient continues to do home exercise to strengthen lower extremities. Patient walks with a cane on occasion    Denies chest pain, edema, fatigue, palpitations and syncope. Compliance reviewed. No medication side effects noted. Review of Systems   Constitutional: Negative for activity change and appetite change. HENT: Negative for congestion. Eyes: Negative for visual disturbance. Respiratory: Negative for chest tightness and shortness of breath. Cardiovascular: Negative for chest pain, palpitations and leg swelling. Gastrointestinal: Negative for abdominal pain. Endocrine: Negative for cold intolerance and heat intolerance. Genitourinary: Negative for difficulty urinating. Musculoskeletal: Negative for arthralgias. Skin: Negative. Negative for rash. Allergic/Immunologic: Negative. Neurological: Negative for dizziness, syncope and light-headedness. Hematological: Negative. Negative for adenopathy. Psychiatric/Behavioral: Negative. Objective  Vitals:    03/09/20 1102   BP: 138/60   Pulse: 54   Resp: 20   Temp: 97.7 °F (36.5 °C)   TempSrc: Temporal   SpO2: 98%   Weight: 185 lb 9.6 oz (84.2 kg)        Physical Exam  Cardiovascular:      Rate and Rhythm: Normal rate and regular rhythm. Pulmonary:      Effort: Pulmonary effort is normal.      Breath sounds: Normal breath sounds. Abdominal:      Palpations: Abdomen is soft. There is no mass. Tenderness: There is no abdominal tenderness. Musculoskeletal:      Right lower leg: No edema. Left lower leg: No edema. Comments: Weakness rt arm  Rt leg   Skin:     General: Skin is warm and dry.    Neurological:      Mental Status: He is alert and oriented to person, place, and time. Motor: Weakness present. Coordination: Coordination abnormal.      Gait: Gait abnormal.   Psychiatric:         Mood and Affect: Mood normal.          Samuel Greenberg was seen today for hypertension, hyperlipidemia and depression. Diagnoses and all orders for this visit:    Essential hypertension  Comments:  stable   Orders:  -     hydrALAZINE (APRESOLINE) 10 MG tablet; Take 1 tablet by mouth 2 times daily  -     amLODIPine (NORVASC) 10 MG tablet; 1 tablet by PEG Tube route daily    Mixed hyperlipidemia  Comments:  due for fasting labs   Orders:  -     atorvastatin (LIPITOR) 40 MG tablet; Take 1 tablet by mouth daily Indications: M-W-f only    Depression, unspecified depression type  Comments:  continue with sertraline   Orders:  -     sertraline (ZOLOFT) 50 MG tablet; Take 1 tablet by mouth daily    Cerebrovascular accident (CVA), unspecified mechanism ([address]    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 I have reviewed the chief complaint and history of present illness (including ROS and PFSH) and vital documentation by my staff and I agree with their documentation and have added where applicable.      Chidi Hart DO

## 2020-03-10 ENCOUNTER — TELEPHONE (OUTPATIENT)
Dept: FAMILY MEDICINE CLINIC | Age: 80
End: 2020-03-10

## 2020-03-10 ENCOUNTER — HOSPITAL ENCOUNTER (OUTPATIENT)
Age: 80
Discharge: HOME | End: 2020-03-12
Payer: MEDICARE

## 2020-03-10 LAB
ALBUMIN SERPL-MCNC: 4.5 G/DL (ref 3.5–5.2)
ALP BLD-CCNC: 49 U/L (ref 40–129)
ALT SERPL-CCNC: 7 U/L (ref 0–40)
ANION GAP SERPL CALCULATED.3IONS-SCNC: 15 MMOL/L (ref 7–16)
AST SERPL-CCNC: 14 U/L (ref 0–39)
BASOPHILS ABSOLUTE: 0.03 E9/L (ref 0–0.2)
BASOPHILS RELATIVE PERCENT: 0.5 % (ref 0–2)
BILIRUB SERPL-MCNC: 1 MG/DL (ref 0–1.2)
BUN BLDV-MCNC: 29 MG/DL (ref 8–23)
CALCIUM SERPL-MCNC: 9.4 MG/DL (ref 8.6–10.2)
CHLORIDE BLD-SCNC: 103 MMOL/L (ref 98–107)
CHOLESTEROL, TOTAL: 150 MG/DL (ref 0–199)
CO2: 22 MMOL/L (ref 22–29)
CREAT SERPL-MCNC: 1.8 MG/DL (ref 0.7–1.2)
EOSINOPHILS ABSOLUTE: 0.39 E9/L (ref 0.05–0.5)
EOSINOPHILS RELATIVE PERCENT: 6.1 % (ref 0–6)
GFR AFRICAN AMERICAN: 44
GFR NON-AFRICAN AMERICAN: 36 ML/MIN/1.73
GLUCOSE BLD-MCNC: 103 MG/DL (ref 74–99)
HCT VFR BLD CALC: 44.5 % (ref 37–54)
HDLC SERPL-MCNC: 50 MG/DL
HEMOGLOBIN: 14.3 G/DL (ref 12.5–16.5)
IMMATURE GRANULOCYTES #: 0.02 E9/L
IMMATURE GRANULOCYTES %: 0.3 % (ref 0–5)
LDL CHOLESTEROL CALCULATED: 72 MG/DL (ref 0–99)
LYMPHOCYTES ABSOLUTE: 2.4 E9/L (ref 1.5–4)
LYMPHOCYTES RELATIVE PERCENT: 37.5 % (ref 20–42)
MCH RBC QN AUTO: 32 PG (ref 26–35)
MCHC RBC AUTO-ENTMCNC: 32.1 % (ref 32–34.5)
MCV RBC AUTO: 99.6 FL (ref 80–99.9)
MONOCYTES ABSOLUTE: 0.62 E9/L (ref 0.1–0.95)
MONOCYTES RELATIVE PERCENT: 9.7 % (ref 2–12)
NEUTROPHILS ABSOLUTE: 2.94 E9/L (ref 1.8–7.3)
NEUTROPHILS RELATIVE PERCENT: 45.9 % (ref 43–80)
PDW BLD-RTO: 12.6 FL (ref 11.5–15)
PLATELET # BLD: 200 E9/L (ref 130–450)
PMV BLD AUTO: 10.8 FL (ref 7–12)
POTASSIUM SERPL-SCNC: 4.5 MMOL/L (ref 3.5–5)
PROSTATE SPECIFIC ANTIGEN: 1.37 NG/ML (ref 0–4)
RBC # BLD: 4.47 E12/L (ref 3.8–5.8)
SODIUM BLD-SCNC: 140 MMOL/L (ref 132–146)
TOTAL PROTEIN: 7.7 G/DL (ref 6.4–8.3)
TRIGL SERPL-MCNC: 139 MG/DL (ref 0–149)
VLDLC SERPL CALC-MCNC: 28 MG/DL
WBC # BLD: 6.4 E9/L (ref 4.5–11.5)

## 2020-03-10 PROCEDURE — 36415 COLL VENOUS BLD VENIPUNCTURE: CPT

## 2020-03-10 PROCEDURE — G0103 PSA SCREENING: HCPCS

## 2020-03-10 PROCEDURE — 80061 LIPID PANEL: CPT

## 2020-03-10 PROCEDURE — 85025 COMPLETE CBC W/AUTO DIFF WBC: CPT

## 2020-03-10 PROCEDURE — 80053 COMPREHEN METABOLIC PANEL: CPT

## 2020-06-05 ENCOUNTER — PROCEDURE VISIT (OUTPATIENT)
Dept: PODIATRY | Age: 80
End: 2020-06-05
Payer: MEDICARE

## 2020-06-05 PROCEDURE — 11721 DEBRIDE NAIL 6 OR MORE: CPT | Performed by: PODIATRIST

## 2020-06-05 PROCEDURE — 11057 PARNG/CUTG B9 HYPRKR LES >4: CPT | Performed by: PODIATRIST

## 2020-06-09 RX ORDER — PRENATAL VIT 91/IRON/FOLIC/DHA 28-975-200
COMBINATION PACKAGE (EA) ORAL
Qty: 1 TUBE | Refills: 2 | Status: SHIPPED | OUTPATIENT
Start: 2020-06-09

## 2020-08-13 ENCOUNTER — PROCEDURE VISIT (OUTPATIENT)
Dept: PODIATRY | Age: 80
End: 2020-08-13
Payer: MEDICARE

## 2020-08-13 PROCEDURE — 11721 DEBRIDE NAIL 6 OR MORE: CPT | Performed by: PODIATRIST

## 2020-08-13 PROCEDURE — 11057 PARNG/CUTG B9 HYPRKR LES >4: CPT | Performed by: PODIATRIST

## 2020-08-13 NOTE — PROGRESS NOTES
Jessie Foster is here today for nail care. his PCP is Deana Horton DO last OV was 20. Jessie Foster : 1940 Sex: male  Age: 79 y.o.    CC:    Follow-up redness and itching bilateral lower legs  Follow-up toenails 1-5 right and left. HPI:   Follow-up redness and itching bilateral lower legs. Follow-up painful toenails 1-5 right and left. Presents today with his wife. Denies any new foot or ankle complaints. Denies any open wounds. Does have Lamisil lotion 1% which she does apply occasionally on the bottom of both feet. States he has not been using as much. No new pedal complaints. No heel pain. Skin  ROS:  Const: Denies constitutional symptoms  Musculo: Denies symptoms other than stated above  Skin: Denies symptoms other than stated above       Current Outpatient Medications:     terbinafine (LAMISIL) 1 % cream, Apply affected area topically 2 times daily. , Disp: 1 Tube, Rfl: 2    tamsulosin (FLOMAX) 0.4 MG capsule, take 1 capsule by mouth twice a day, Disp: 180 capsule, Rfl: 1    sertraline (ZOLOFT) 50 MG tablet, Take 1 tablet by mouth daily, Disp: 90 tablet, Rfl: 1    hydrALAZINE (APRESOLINE) 10 MG tablet, Take 1 tablet by mouth 2 times daily, Disp: 180 tablet, Rfl: 1    levETIRAcetam (KEPPRA) 500 MG tablet, Take 1 tablet by mouth 2 times daily, Disp: 60 tablet, Rfl: 4    atorvastatin (LIPITOR) 40 MG tablet, Take 1 tablet by mouth daily Indications: M-W-f only, Disp: 90 tablet, Rfl: 1    amLODIPine (NORVASC) 10 MG tablet, 1 tablet by PEG Tube route daily, Disp: 90 tablet, Rfl: 1    betamethasone dipropionate (DIPROLENE) 0.05 % cream, Apply to affected area topically 2 times daily. , Disp: 1 Tube, Rfl: 2    terbinafine (LAMISIL) 1 % cream, Apply affected area topically 2 times daily. , Disp: 1 Tube, Rfl: 2    Multiple Vitamins-Minerals (OCUVITE ADULT FORMULA) CAPS, Take by mouth daily, Disp: , Rfl:     Coenzyme Q10 (CO Q 10) 100 MG CAPS, Take by mouth, Disp: , Rfl:     docusate sodium (RA COL-RITE) 100 MG capsule, Take 100 mg by mouth 2 times daily, Disp: , Rfl:     ferrous sulfate 325 (65 FE) MG tablet, Take 325 mg by mouth daily (with breakfast), Disp: , Rfl:   Allergies   Allergen Reactions    Pcn [Penicillins]        Past Medical History:   Diagnosis Date    Atrial fibrillation ([facility] [address]     Blood transfusion reaction     past week(5/5/2014)    Hyperlipidemia     Hypertension     Stroke ([facility] 07/31/2016           There were no vitals filed for this visit. Work History/Social History:  [place] [employer]    Focused Lower Extremity Physical Exam:    Neurovascular examination:    Dorsalis Pedis palpable bilateral.  Posterior tibialis palpable bilateral.    Capillary Refill Time:  Immediate return  Hair growth:  Symmetrical and bilateral   Skin:  Not atrophic  Edema: No edema bilateral feet or ankles. Neurologic:  Light touch intact bilateral.      Musculoskeletal/ Orthopedic examination:    Equinis: Absent bilateral  Dorsiflexion, plantarflexion, inversion, eversion bilateral 5 out of 5 muscle strength  Wiggling toes  Negative Homans    Dermatology examination:      Bilateral lower legs with decreased erythematous skin. No open lesions. Toenails 1-5 right and left thickened, elongated, dystrophic, mycotic with subungual debris. Webspaces 1-4 bilateral clean, dry and intact. Hyperkeratotic tissue  plantar first, third and fifth metatarsal heads bilateral  Mild moccasin distribution with erythema plantar feet. No open skin lesions. Assessment and Plan:  Kimo Ulloa was seen today for callouses and nail problem. Diagnoses and all orders for this visit:    Tinea unguium    Corns and callosities    Tinea pedis of both feet    Hereditary sensory neuropathy    Xerosis cutis    Dermatitis of lower extremity      Patient seen in follow-up today. Presents today with his wife.   Manual debridement with standard technique toenails 1 through 5 right and left in thickness and length. Patient tolerated procedure well. Paring of hyperkeratotic tissue with #15 blade plantar first, third and fifth metatarsal heads bilateral    Lamisil 1% topically on the bottom both feet as needed. No open wounds progressing well. Follow-up 2 months. Return in about 2 months (around 10/13/2020). Seen By:  Natalia León DPM      Document was created using voice recognition software. Note was reviewed, however may contain grammatical errors.

## 2020-10-15 ENCOUNTER — PROCEDURE VISIT (OUTPATIENT)
Dept: PODIATRY | Age: 80
End: 2020-10-15
Payer: MEDICARE

## 2020-10-15 VITALS — WEIGHT: 185 LBS | RESPIRATION RATE: 20 BRPM | HEIGHT: 72 IN | BODY MASS INDEX: 25.06 KG/M2

## 2020-10-15 PROCEDURE — 11056 PARNG/CUTG B9 HYPRKR LES 2-4: CPT | Performed by: PODIATRIST

## 2020-10-15 PROCEDURE — 11721 DEBRIDE NAIL 6 OR MORE: CPT | Performed by: PODIATRIST

## 2020-10-15 NOTE — PROGRESS NOTES
Joy Krueger is here today for nail care. his PCP is Zigmund Favre, DO last OV was 3/9/20. Joy Krueger : 1940 Sex: male  Age: 79 y.o.    CC:    Follow-up redness and itching bilateral lower legs  Follow-up toenails 1-5 right and left. HPI:   Follow-up redness and itching bilateral lower legs. Follow-up painful toenails 1-5 right and left. Does have Lamisil 1% lotion he has been applying occasionally. No pain in the bottom of either foot. No open wounds no new skin lesions. States he has not been walking as much recently. No new injuries. Skin  ROS:  Const: Denies constitutional symptoms  Musculo: Denies symptoms other than stated above  Skin: Denies symptoms other than stated above       Current Outpatient Medications:     terbinafine (LAMISIL) 1 % cream, Apply affected area topically 2 times daily. , Disp: 1 Tube, Rfl: 2    tamsulosin (FLOMAX) 0.4 MG capsule, take 1 capsule by mouth twice a day, Disp: 180 capsule, Rfl: 1    sertraline (ZOLOFT) 50 MG tablet, Take 1 tablet by mouth daily, Disp: 90 tablet, Rfl: 1    hydrALAZINE (APRESOLINE) 10 MG tablet, Take 1 tablet by mouth 2 times daily, Disp: 180 tablet, Rfl: 1    levETIRAcetam (KEPPRA) 500 MG tablet, Take 1 tablet by mouth 2 times daily, Disp: 60 tablet, Rfl: 4    atorvastatin (LIPITOR) 40 MG tablet, Take 1 tablet by mouth daily Indications: M-W-f only, Disp: 90 tablet, Rfl: 1    amLODIPine (NORVASC) 10 MG tablet, 1 tablet by PEG Tube route daily, Disp: 90 tablet, Rfl: 1    betamethasone dipropionate (DIPROLENE) 0.05 % cream, Apply to affected area topically 2 times daily. , Disp: 1 Tube, Rfl: 2    terbinafine (LAMISIL) 1 % cream, Apply affected area topically 2 times daily. , Disp: 1 Tube, Rfl: 2    Multiple Vitamins-Minerals (OCUVITE ADULT FORMULA) CAPS, Take by mouth daily, Disp: , Rfl:     Coenzyme Q10 (CO Q 10) 100 MG CAPS, Take by mouth, Disp: , Rfl:     docusate sodium (RA COL-RITE) 100 MG capsule, Take 100 mg by mouth 2 times daily, Disp: , Rfl:     ferrous sulfate 325 (65 FE) MG tablet, Take 325 mg by mouth daily (with breakfast), Disp: , Rfl:   Allergies   Allergen Reactions    Pcn [Penicillins]        Past Medical History:   Diagnosis Date    Atrial fibrillation ([facility] [address]     Blood transfusion reaction     past week(5/5/2014)    Hyperlipidemia     Hypertension     Stroke ([facility] [address] 07/31/2016           Vitals:    10/15/20 1044   Resp: 20   Weight: 185 lb (83.9 kg)   Height: 6' (1.829 m)       Work History/Social History:  [place] [employer]    Focused Lower Extremity Physical Exam:    Neurovascular examination:    Dorsalis Pedis palpable bilateral.  Posterior tibialis palpable bilateral.    Capillary Refill Time:  Immediate return  Hair growth:  Symmetrical and bilateral   Skin:  Not atrophic  Edema: No edema bilateral feet or ankles. Neurologic:  Light touch intact bilateral.      Musculoskeletal/ Orthopedic examination:    Equinis: Absent bilateral  Dorsiflexion, plantarflexion, inversion, eversion bilateral 5 out of 5 muscle strength  Wiggling toes  [facility]    Dermatology examination:      Bilateral lower legs with decreased erythematous skin. No open lesions. Toenails 1-5 right and left thickened, elongated, dystrophic, mycotic with subungual debris. Webspaces 1-4 bilateral clean, dry and intact. Hyperkeratotic tissue  plantar first, third and fifth metatarsal heads bilateral  Mild moccasin distribution with erythema plantar feet-improving    Assessment and Plan:  Cosmo Guthrie was seen today for nail problem. Diagnoses and all orders for this visit:    Tinea unguium    Corns and callosities    Tinea pedis of both feet    Hereditary sensory neuropathy    Xerosis cutis      Patient seen follow-up foot and ankle exam today. Manual debridement with standard technique toenails 1 through 5 right and left in thickness and length. Patient tolerated procedure well.   Paring of hyperkeratotic tissue with #15 blade plantar first, third and fifth metatarsal heads bilateral    Patient still does have Lamisil 1% I did recommend application once daily on bottom both feet. Importance of avoiding barefoot. Follow-up 2 months. Return in about 2 months (around 12/15/2020). Seen By:  Dread Bae DPM      Document was created using voice recognition software. Note was reviewed, however may contain grammatical errors.

## 2020-12-17 ENCOUNTER — PROCEDURE VISIT (OUTPATIENT)
Dept: PODIATRY | Age: 80
End: 2020-12-17
Payer: MEDICARE

## 2020-12-17 PROCEDURE — 11721 DEBRIDE NAIL 6 OR MORE: CPT | Performed by: PODIATRIST

## 2020-12-17 PROCEDURE — 11056 PARNG/CUTG B9 HYPRKR LES 2-4: CPT | Performed by: PODIATRIST

## 2020-12-17 NOTE — PROGRESS NOTES
Usama Ochoa is here today for nail care. his PCP is Lindsey Polo DO last OV was 20      Usama Ochoa : 1940 Sex: male  Age: 79 y.o.    CC:    Follow-up redness and itching bilateral lower legs  Follow-up toenails 1-5 right and left. HPI:   Follow-up redness and itching bilateral lower legs. Follow-up painful toenails 1-5 right and left. Does continue topical Lamisil 1% as needed on the bottom both feet. No open skin lesions or abrasions. Does present today with his wife. Does have tenderness all toenails. No additional pedal complaints. ROS:  Const: Denies constitutional symptoms  Musculo: Denies symptoms other than stated above  Skin: Denies symptoms other than stated above       Current Outpatient Medications:     terbinafine (LAMISIL) 1 % cream, Apply affected area topically 2 times daily. , Disp: 1 Tube, Rfl: 2    tamsulosin (FLOMAX) 0.4 MG capsule, take 1 capsule by mouth twice a day, Disp: 180 capsule, Rfl: 1    sertraline (ZOLOFT) 50 MG tablet, Take 1 tablet by mouth daily, Disp: 90 tablet, Rfl: 1    hydrALAZINE (APRESOLINE) 10 MG tablet, Take 1 tablet by mouth 2 times daily, Disp: 180 tablet, Rfl: 1    levETIRAcetam (KEPPRA) 500 MG tablet, Take 1 tablet by mouth 2 times daily, Disp: 60 tablet, Rfl: 4    atorvastatin (LIPITOR) 40 MG tablet, Take 1 tablet by mouth daily Indications: M-W-f only, Disp: 90 tablet, Rfl: 1    amLODIPine (NORVASC) 10 MG tablet, 1 tablet by PEG Tube route daily, Disp: 90 tablet, Rfl: 1    betamethasone dipropionate (DIPROLENE) 0.05 % cream, Apply to affected area topically 2 times daily. , Disp: 1 Tube, Rfl: 2    terbinafine (LAMISIL) 1 % cream, Apply affected area topically 2 times daily. , Disp: 1 Tube, Rfl: 2    Multiple Vitamins-Minerals (OCUVITE ADULT FORMULA) CAPS, Take by mouth daily, Disp: , Rfl:     Coenzyme Q10 (CO Q 10) 100 MG CAPS, Take by mouth, Disp: , Rfl:     docusate sodium (RA COL-RITE) 100 MG capsule, Take 100 mg by mouth 2 times daily, Disp: , Rfl:     ferrous sulfate 325 (65 FE) MG tablet, Take 325 mg by mouth daily (with breakfast), Disp: , Rfl:   Allergies   Allergen Reactions    Pcn [Penicillins]        Past Medical History:   Diagnosis Date    At[facility] fibrillation ([facility] [address]     Blood transfusion reaction     past week(5/5/2014)    Hyperlipidemia     Hypertension     Stroke ([facility] [address] 07/31/2016           There were no vitals filed for this visit. Work History/Social History:  [place] [employer]    Focused Lower Extremity Physical Exam:    Neurovascular examination:    Dorsalis Pedis palpable bilateral.  Posterior tibialis palpable bilateral.    Capillary Refill Time:  Immediate return  Hair growth:  Symmetrical and bilateral   Skin:  Not atrophic  Edema: No edema bilateral feet or ankles. Neurologic:  Light touch intact bilateral.      Musculoskeletal/ Orthopedic examination:    Equinis: Absent bilateral  Dorsiflexion, plantarflexion, inversion, eversion bilateral 5 out of 5 muscle strength  Wiggling toes  Negative Homans    Dermatology examination:      Bilateral lower legs with decreased erythematous skin. No open lesions. Toenails 1-5 right and left thickened, elongated, dystrophic, mycotic with subungual debris. Webspaces 1-4 bilateral clean, dry and intact. Hyperkeratotic tissue  plantar first, third and fifth metatarsal heads bilateral  [place] distribution plantar heels. No open skin lesions. Assessment and Plan:  Roverto Herbert was seen today for callouses and nail problem. Diagnoses and all orders for this visit:    Tinea unguium    Corns and callosities    Tinea pedis of both feet    Hereditary sensory neuropathy    Xerosis cutis          Follow-up painful toenails. Manual debridement with standard technique toenails 1 through 5 right and left in thickness and length. Patient tolerated procedure well.   Paring of hyperkeratotic tissue with #15 blade plantar first, third and fifth metatarsal heads bilateral    Topical Lamisil 1% on the bottom both feet as needed. Follow-up 2 months. Return in about 2 months (around 2/17/2021). Seen By:  Will Perez DPM      Document was created using voice recognition software. Note was reviewed, however may contain grammatical errors.

## 2021-01-23 ENCOUNTER — IMMUNIZATION (OUTPATIENT)
Dept: PRIMARY CARE CLINIC | Age: 81
End: 2021-01-23
Payer: MEDICARE

## 2021-01-23 PROCEDURE — 91300 COVID-19, PFIZER VACCINE 30MCG/0.3ML DOSE: CPT | Performed by: FAMILY MEDICINE

## 2021-01-23 PROCEDURE — 0001A COVID-19, PFIZER VACCINE 30MCG/0.3ML DOSE: CPT | Performed by: FAMILY MEDICINE

## 2021-02-13 ENCOUNTER — IMMUNIZATION (OUTPATIENT)
Dept: PRIMARY CARE CLINIC | Age: 81
End: 2021-02-13
Payer: MEDICARE

## 2021-02-13 PROCEDURE — 0002A COVID-19, PFIZER VACCINE 30MCG/0.3ML DOSE: CPT | Performed by: EMERGENCY MEDICINE

## 2021-02-13 PROCEDURE — 91300 COVID-19, PFIZER VACCINE 30MCG/0.3ML DOSE: CPT | Performed by: EMERGENCY MEDICINE

## 2021-03-18 ENCOUNTER — PROCEDURE VISIT (OUTPATIENT)
Dept: PODIATRY | Age: 81
End: 2021-03-18
Payer: MEDICARE

## 2021-03-18 DIAGNOSIS — L85.3 XEROSIS CUTIS: ICD-10-CM

## 2021-03-18 DIAGNOSIS — G60.8 HEREDITARY SENSORY NEUROPATHY: ICD-10-CM

## 2021-03-18 DIAGNOSIS — L84 CORNS AND CALLOSITIES: ICD-10-CM

## 2021-03-18 DIAGNOSIS — B35.1 TINEA UNGUIUM: Primary | ICD-10-CM

## 2021-03-18 DIAGNOSIS — B35.3 TINEA PEDIS OF BOTH FEET: ICD-10-CM

## 2021-03-18 PROCEDURE — 11056 PARNG/CUTG B9 HYPRKR LES 2-4: CPT | Performed by: PODIATRIST

## 2021-03-18 PROCEDURE — 11721 DEBRIDE NAIL 6 OR MORE: CPT | Performed by: PODIATRIST

## 2021-03-18 NOTE — PROGRESS NOTES
Armen Godinez is here today for nail care. his PCP is Beronica Jurado DO last OV was 21. Armen Godinez : 1940 Sex: male  Age: 79 y.o.    CC:    Follow-up redness and itching bilateral lower legs  Follow-up toenails 1-5 right and left. HPI:   Follow-up redness and itching bilateral lower legs. Follow-up painful toenails 1-5 right and left. Denies any open skin lesions abrasions. Does have tenderness on the bottom of the left heel where he does have significant callus tissue today. No additional pedal complaints. Does present that his wife. ROS:  Const: Denies constitutional symptoms  Musculo: Denies symptoms other than stated above  Skin: Denies symptoms other than stated above       Current Outpatient Medications:     terbinafine (LAMISIL) 1 % cream, Apply affected area topically 2 times daily. , Disp: 1 Tube, Rfl: 2    tamsulosin (FLOMAX) 0.4 MG capsule, take 1 capsule by mouth twice a day, Disp: 180 capsule, Rfl: 1    sertraline (ZOLOFT) 50 MG tablet, Take 1 tablet by mouth daily, Disp: 90 tablet, Rfl: 1    hydrALAZINE (APRESOLINE) 10 MG tablet, Take 1 tablet by mouth 2 times daily, Disp: 180 tablet, Rfl: 1    levETIRAcetam (KEPPRA) 500 MG tablet, Take 1 tablet by mouth 2 times daily, Disp: 60 tablet, Rfl: 4    atorvastatin (LIPITOR) 40 MG tablet, Take 1 tablet by mouth daily Indications: M-W-f only, Disp: 90 tablet, Rfl: 1    amLODIPine (NORVASC) 10 MG tablet, 1 tablet by PEG Tube route daily, Disp: 90 tablet, Rfl: 1    betamethasone dipropionate (DIPROLENE) 0.05 % cream, Apply to affected area topically 2 times daily. , Disp: 1 Tube, Rfl: 2    terbinafine (LAMISIL) 1 % cream, Apply affected area topically 2 times daily. , Disp: 1 Tube, Rfl: 2    Multiple Vitamins-Minerals (OCUVITE ADULT FORMULA) CAPS, Take by mouth daily, Disp: , Rfl:     Coenzyme Q10 (CO Q 10) 100 MG CAPS, Take by mouth, Disp: , Rfl:     docusate sodium (RA COL-RITE) 100 MG capsule, Take 100 mg by mouth 2 times daily, Disp: , Rfl:     ferrous sulfate 325 (65 FE) MG tablet, Take 325 mg by mouth daily (with breakfast), Disp: , Rfl:   Allergies   Allergen Reactions    Pcn [Penicillins]        Past Medical History:   Diagnosis Date    Atrial fibrillation ([facility] [address]     Blood transfusion reaction     past week(5/5/2014)    Hyperlipidemia     Hypertension     Stroke ([facility] [address] 07/31/2016           There were no vitals filed for this visit. Work History/Social History:  [place] [employer]    Focused Lower Extremity Physical Exam:    Neurovascular examination:    Dorsalis Pedis palpable bilateral.  Posterior tibialis palpable bilateral.    Capillary Refill Time:  Immediate return  Hair growth:  Symmetrical and bilateral   Skin:  Not atrophic  Edema: No edema bilateral feet or ankles. Neurologic:  Light touch intact bilateral.      Musculoskeletal/ Orthopedic examination:    Equinis: Absent bilateral  Dorsiflexion, plantarflexion, inversion, eversion bilateral 5 out of 5 muscle strength  Wiggling toes  Negative Homans    Dermatology examination:      Bilateral lower legs with decreased erythematous skin. No open lesions. Toenails 1-5 right and left thickened, elongated, dystrophic, mycotic with subungual debris. Webspaces 1-4 bilateral clean, dry and intact. Hyperkeratotic tissue  plantar first and fifth metatarsal heads bilateral  Mild hyperkeratotic tissue plantar left heel. Assessment and Plan:  Dennis Valerio was seen today for callouses and nail problem. Diagnoses and all orders for this visit:    Tinea unguium    Corns and callosities    Tinea pedis of both feet    Hereditary sensory neuropathy    Xerosis cutis        Follow-up foot ankle exam    Manual debridement with standard technique toenails 1 through 5 right and left in thickness and length. Patient tolerated procedure well.   Paring of hyperkeratotic tissue with #15 blade plantar first and fifth metatarsal head bilateral.  Also did debride hyperkeratotic tissue plantar central left heel. Continue all supportive walking shoes. Avoid barefoot. Does have topical Lamisil and topical ammonium lactate 12% to be used as needed on the bottom both feet. Follow-up 3 months    Return in about 2 months (around 5/18/2021). Seen By:  Roula Spears DPM      Document was created using voice recognition software. Note was reviewed, however may contain grammatical errors.

## 2021-06-18 ENCOUNTER — PROCEDURE VISIT (OUTPATIENT)
Dept: PODIATRY | Age: 81
End: 2021-06-18
Payer: MEDICARE

## 2021-06-18 VITALS — BODY MASS INDEX: 25.06 KG/M2 | HEIGHT: 72 IN | WEIGHT: 185 LBS

## 2021-06-18 DIAGNOSIS — B35.3 TINEA PEDIS OF BOTH FEET: ICD-10-CM

## 2021-06-18 DIAGNOSIS — G60.8 HEREDITARY SENSORY NEUROPATHY: ICD-10-CM

## 2021-06-18 DIAGNOSIS — L85.3 XEROSIS CUTIS: ICD-10-CM

## 2021-06-18 DIAGNOSIS — L84 CORNS AND CALLOSITIES: ICD-10-CM

## 2021-06-18 DIAGNOSIS — B35.1 TINEA UNGUIUM: Primary | ICD-10-CM

## 2021-06-18 PROCEDURE — 11721 DEBRIDE NAIL 6 OR MORE: CPT | Performed by: PODIATRIST

## 2021-06-18 PROCEDURE — 11056 PARNG/CUTG B9 HYPRKR LES 2-4: CPT | Performed by: PODIATRIST

## 2021-06-18 NOTE — PROGRESS NOTES
Dario Rock is here today for nail care. his PCP is Dai Davila DO last OV was 21. Dario Rock : 1940 Sex: male  Age: 79 y.o.    CC:    Follow-up redness and itching bilateral lower legs  Follow-up toenails 1-5 right and left. HPI:   Follow-up redness and itching bilateral lower legs. Follow-up painful toenails 1-5 right and left. Does also have callus tissue on bottom left heel. Does have topical Lamisil 1%. No open wounds. No additional pedal complaints today. ROS:  Const: Denies constitutional symptoms  Musculo: Denies symptoms other than stated above  Skin: Denies symptoms other than stated above       Current Outpatient Medications:     terbinafine (LAMISIL) 1 % cream, Apply affected area topically 2 times daily. , Disp: 1 Tube, Rfl: 2    tamsulosin (FLOMAX) 0.4 MG capsule, take 1 capsule by mouth twice a day, Disp: 180 capsule, Rfl: 1    sertraline (ZOLOFT) 50 MG tablet, Take 1 tablet by mouth daily, Disp: 90 tablet, Rfl: 1    hydrALAZINE (APRESOLINE) 10 MG tablet, Take 1 tablet by mouth 2 times daily, Disp: 180 tablet, Rfl: 1    levETIRAcetam (KEPPRA) 500 MG tablet, Take 1 tablet by mouth 2 times daily, Disp: 60 tablet, Rfl: 4    atorvastatin (LIPITOR) 40 MG tablet, Take 1 tablet by mouth daily Indications: M-W-f only, Disp: 90 tablet, Rfl: 1    amLODIPine (NORVASC) 10 MG tablet, 1 tablet by PEG Tube route daily, Disp: 90 tablet, Rfl: 1    betamethasone dipropionate (DIPROLENE) 0.05 % cream, Apply to affected area topically 2 times daily. , Disp: 1 Tube, Rfl: 2    terbinafine (LAMISIL) 1 % cream, Apply affected area topically 2 times daily. , Disp: 1 Tube, Rfl: 2    Multiple Vitamins-Minerals (OCUVITE ADULT FORMULA) CAPS, Take by mouth daily, Disp: , Rfl:     Coenzyme Q10 (CO Q 10) 100 MG CAPS, Take by mouth, Disp: , Rfl:     docusate sodium (RA COL-RITE) 100 MG capsule, Take 100 mg by mouth 2 times daily, Disp: , Rfl:     ferrous Paring hyperkeratotic tissue plantar central left heel. No open wounds after debridement. Follow-up 3 months. Return in about 3 months (around 9/18/2021). Seen By:  Christen Kennedy DPM      Document was created using voice recognition software. Note was reviewed, however may contain grammatical errors.

## 2021-09-24 ENCOUNTER — PROCEDURE VISIT (OUTPATIENT)
Dept: PODIATRY | Age: 81
End: 2021-09-24
Payer: MEDICARE

## 2021-09-24 DIAGNOSIS — L84 CORNS AND CALLOSITIES: ICD-10-CM

## 2021-09-24 DIAGNOSIS — L85.3 XEROSIS CUTIS: ICD-10-CM

## 2021-09-24 DIAGNOSIS — G60.8 HEREDITARY SENSORY NEUROPATHY: ICD-10-CM

## 2021-09-24 DIAGNOSIS — B35.3 TINEA PEDIS OF BOTH FEET: ICD-10-CM

## 2021-09-24 DIAGNOSIS — B35.1 TINEA UNGUIUM: Primary | ICD-10-CM

## 2021-09-24 PROCEDURE — 11056 PARNG/CUTG B9 HYPRKR LES 2-4: CPT | Performed by: PODIATRIST

## 2021-09-24 PROCEDURE — 11721 DEBRIDE NAIL 6 OR MORE: CPT | Performed by: PODIATRIST

## 2021-09-24 NOTE — PROGRESS NOTES
Raul Rangel is here today for nail care. his PCP is Nancy Riddle DO last OV was 21. Raul Rangel : 1940 Sex: male  Age: 79 y.o.    CC:    Follow-up redness and itching bilateral lower legs  Follow-up toenails 1-5 right and left. HPI:   Follow-up redness and itching bilateral lower legs. Follow-up painful toenails 1-5 right and left. No significant redness or itching either foot today. Does still have Lamisil 1% cream for the bottom of his feet. Does use occasionally. No open wounds. No calf pain. No additional pedal complaints. ROS:  Const: Denies constitutional symptoms  Musculo: Denies symptoms other than stated above  Skin: Denies symptoms other than stated above       Current Outpatient Medications:     terbinafine (LAMISIL) 1 % cream, Apply affected area topically 2 times daily. , Disp: 1 Tube, Rfl: 2    tamsulosin (FLOMAX) 0.4 MG capsule, take 1 capsule by mouth twice a day, Disp: 180 capsule, Rfl: 1    sertraline (ZOLOFT) 50 MG tablet, Take 1 tablet by mouth daily, Disp: 90 tablet, Rfl: 1    hydrALAZINE (APRESOLINE) 10 MG tablet, Take 1 tablet by mouth 2 times daily, Disp: 180 tablet, Rfl: 1    levETIRAcetam (KEPPRA) 500 MG tablet, Take 1 tablet by mouth 2 times daily, Disp: 60 tablet, Rfl: 4    atorvastatin (LIPITOR) 40 MG tablet, Take 1 tablet by mouth daily Indications: M-W-f only, Disp: 90 tablet, Rfl: 1    amLODIPine (NORVASC) 10 MG tablet, 1 tablet by PEG Tube route daily, Disp: 90 tablet, Rfl: 1    betamethasone dipropionate (DIPROLENE) 0.05 % cream, Apply to affected area topically 2 times daily. , Disp: 1 Tube, Rfl: 2    terbinafine (LAMISIL) 1 % cream, Apply affected area topically 2 times daily. , Disp: 1 Tube, Rfl: 2    Multiple Vitamins-Minerals (OCUVITE ADULT FORMULA) CAPS, Take by mouth daily, Disp: , Rfl:     Coenzyme Q10 (CO Q 10) 100 MG CAPS, Take by mouth, Disp: , Rfl:     docusate sodium (RA COL-RITE) 100 MG capsule, Take 100 mg by mouth 2 times daily, Disp: , Rfl:     ferrous sulfate 325 (65 FE) MG tablet, Take 325 mg by mouth daily (with breakfast), Disp: , Rfl:   Allergies   Allergen Reactions    Pcn [Penicillins]        Past Medical History:   Diagnosis Date    Atrial fibrillation ([facility] [address]     Blood transfusion reaction     past week(5/5/2014)    Hyperlipidemia     Hypertension     Stroke ([facility] [address] 07/31/2016           There were no vitals filed for this visit. Work History/Social History:  [place] [employer]    Focused Lower Extremity Physical Exam:    Neurovascular examination:    Dorsalis Pedis palpable bilateral.  Posterior tibialis palpable bilateral.    Capillary Refill Time:  Immediate return  Hair growth:  Symmetrical and bilateral   Skin:  Not atrophic  Edema: No edema bilateral feet or ankles. Neurologic:  Light touch intact bilateral.      Musculoskeletal/ Orthopedic examination:    Equinis: Absent bilateral  Dorsiflexion, plantarflexion, inversion, eversion bilateral 5 out of 5 muscle strength  Wiggling toes  Negative Homans    Dermatology examination:      Bilateral lower legs with decreased erythematous skin. No open lesions. Toenails 1-5 right and left thickened, elongated, dystrophic, mycotic with subungual debris. Webspaces 1-4 bilateral clean, dry and intact. Hyperkeratotic tissue  plantar first and fifth metatarsal heads bilateral  There is mild hyperkeratotic tissue noted plantar central left heel. No open wound. No surrounding erythema. Assessment and Plan:  Tiarra Castro was seen today for callouses and nail problem. Diagnoses and all orders for this visit:    Tinea unguium    Corns and callosities    Tinea pedis of both feet    Xerosis cutis    Hereditary sensory neuropathy        Follow-up foot ankle exam  Manual debridement with standard technique toenails 1 through 5 right and left in thickness and length. Patient tolerated procedure well.   Paring of hyperkeratotic tissue with #15 blade plantar first and fifth metatarsal head bilateral.    Paring hyperkeratotic tissue plantar central left heel. Continue AmLactin lotion 12% twice daily bottom both feet. Avoid barefoot. Any new skin lesion abrasions come in sooner. Follow-up 2 months            Return in about 2 months (around 11/24/2021). Seen By:  Gwen Armijo DPM      Document was created using voice recognition software. Note was reviewed, however may contain grammatical errors.

## 2021-11-30 ENCOUNTER — PROCEDURE VISIT (OUTPATIENT)
Dept: PODIATRY | Age: 81
End: 2021-11-30
Payer: MEDICARE

## 2021-11-30 VITALS — WEIGHT: 185 LBS | HEIGHT: 72 IN | BODY MASS INDEX: 25.06 KG/M2

## 2021-11-30 DIAGNOSIS — B35.1 TINEA UNGUIUM: Primary | ICD-10-CM

## 2021-11-30 DIAGNOSIS — L85.3 XEROSIS CUTIS: ICD-10-CM

## 2021-11-30 DIAGNOSIS — B35.3 TINEA PEDIS OF BOTH FEET: ICD-10-CM

## 2021-11-30 DIAGNOSIS — L84 CORNS AND CALLOSITIES: ICD-10-CM

## 2021-11-30 DIAGNOSIS — G60.8 HEREDITARY SENSORY NEUROPATHY: ICD-10-CM

## 2021-11-30 PROCEDURE — 11056 PARNG/CUTG B9 HYPRKR LES 2-4: CPT | Performed by: PODIATRIST

## 2021-11-30 PROCEDURE — 11721 DEBRIDE NAIL 6 OR MORE: CPT | Performed by: PODIATRIST

## 2021-11-30 NOTE — PROGRESS NOTES
Kristan Hopper is here today for nail care. his PCP is Lita Young DO last OV was 2021. Kristan Hopper : 1940 Sex: male  Age: 79 y.o.    CC:    Follow-up redness and itching bilateral lower legs  Follow-up toenails 1-5 right and left. HPI:   Follow-up redness and itching bilateral lower legs. Follow-up painful toenails 1-5 right and left. No calf pain. No open wounds. Does present today with his wife. No additional pedal complaints. ROS:  Const: Denies constitutional symptoms  Musculo: Denies symptoms other than stated above  Skin: Denies symptoms other than stated above       Current Outpatient Medications:     terbinafine (LAMISIL) 1 % cream, Apply affected area topically 2 times daily. , Disp: 1 Tube, Rfl: 2    tamsulosin (FLOMAX) 0.4 MG capsule, take 1 capsule by mouth twice a day, Disp: 180 capsule, Rfl: 1    sertraline (ZOLOFT) 50 MG tablet, Take 1 tablet by mouth daily, Disp: 90 tablet, Rfl: 1    hydrALAZINE (APRESOLINE) 10 MG tablet, Take 1 tablet by mouth 2 times daily, Disp: 180 tablet, Rfl: 1    levETIRAcetam (KEPPRA) 500 MG tablet, Take 1 tablet by mouth 2 times daily, Disp: 60 tablet, Rfl: 4    atorvastatin (LIPITOR) 40 MG tablet, Take 1 tablet by mouth daily Indications: M-W-f only, Disp: 90 tablet, Rfl: 1    amLODIPine (NORVASC) 10 MG tablet, 1 tablet by PEG Tube route daily, Disp: 90 tablet, Rfl: 1    betamethasone dipropionate (DIPROLENE) 0.05 % cream, Apply to affected area topically 2 times daily. , Disp: 1 Tube, Rfl: 2    terbinafine (LAMISIL) 1 % cream, Apply affected area topically 2 times daily. , Disp: 1 Tube, Rfl: 2    Multiple Vitamins-Minerals (OCUVITE ADULT FORMULA) CAPS, Take by mouth daily, Disp: , Rfl:     Coenzyme Q10 (CO Q 10) 100 MG CAPS, Take by mouth, Disp: , Rfl:     docusate sodium (RA COL-RITE) 100 MG capsule, Take 100 mg by mouth 2 times daily, Disp: , Rfl:     ferrous sulfate 325 (65 FE) MG tablet, Take 325 mg by mouth daily (with breakfast), Disp: , Rfl:   Allergies   Allergen Reactions    Pcn [Penicillins]        Past Medical History:   Diagnosis Date    Atrial fibrillation ([facility] [address]     Blood transfusion reaction     past week(5/5/2014)    Hyperlipidemia     Hypertension     Stroke ([facility] 07/31/2016           Vitals:    11/30/21 0933   Weight: 185 lb (83.9 kg)   Height: 6' (1.829 m)       Work History/Social History:  [place] [employer]    Focused Lower Extremity Physical Exam:    Neurovascular examination:    Dorsalis Pedis palpable bilateral.  Posterior tibialis palpable bilateral.    Capillary Refill Time:  Immediate return  Hair growth:  Symmetrical and bilateral   Skin:  Not atrophic  Edema: No edema bilateral feet or ankles. Neurologic:  Light touch intact bilateral.      Musculoskeletal/ Orthopedic examination:    Equinis: Absent bilateral  Dorsiflexion, plantarflexion, inversion, eversion bilateral 5 out of 5 muscle strength  Wiggling toes  Negative Homans    Dermatology examination:      Bilateral lower legs with decreased erythematous skin. No open lesions. Toenails 1-5 right and left thickened, elongated, dystrophic, mycotic with subungual debris. Webspaces 1-4 bilateral clean, dry and intact. Hyperkeratotic tissue  plantar first and fifth metatarsal heads bilateral  No open wounds      Assessment and Plan:  Justin Oliver was seen today for callouses and nail problem. Diagnoses and all orders for this visit:    Tinea unguium    Corns and callosities    Tinea pedis of both feet    Xerosis cutis    Hereditary sensory neuropathy              Follow-up foot ankle exam  Manual debridement with standard technique toenails 1 through 5 right and left in thickness and length. Patient tolerated procedure well. Paring of hyperkeratotic tissue with #15 blade plantar first and fifth metatarsal head bilateral.      Follow-up 2 months                Return in about 2 months (around 1/30/2022).       Seen By:  Tom Friedman DPM      Document was created using voice recognition software. Note was reviewed, however may contain grammatical errors.

## 2022-03-01 ENCOUNTER — PROCEDURE VISIT (OUTPATIENT)
Dept: PODIATRY | Age: 82
End: 2022-03-01
Payer: MEDICARE

## 2022-03-01 DIAGNOSIS — L85.3 XEROSIS CUTIS: ICD-10-CM

## 2022-03-01 DIAGNOSIS — G60.8 HEREDITARY SENSORY NEUROPATHY: ICD-10-CM

## 2022-03-01 DIAGNOSIS — B35.3 TINEA PEDIS OF BOTH FEET: ICD-10-CM

## 2022-03-01 DIAGNOSIS — L84 CORNS AND CALLOSITIES: ICD-10-CM

## 2022-03-01 DIAGNOSIS — B35.1 TINEA UNGUIUM: Primary | ICD-10-CM

## 2022-03-01 PROCEDURE — 11721 DEBRIDE NAIL 6 OR MORE: CPT | Performed by: PODIATRIST

## 2022-03-01 PROCEDURE — 11056 PARNG/CUTG B9 HYPRKR LES 2-4: CPT | Performed by: PODIATRIST

## 2022-03-01 NOTE — PROGRESS NOTES
[place] is here today for nail care. his PCP is [address] was 09/06/2021. CC:  Follow-up toenails 1-5 right and left. HPI:   Follow-up and ankle exam.  Does have topical Lamisil he has been using occasionally. Does present with his wife. Here today elongated toenails 1 through 5 right and left. No recent injury. No additional pedal complaints. ROS:  Const: Denies constitutional symptoms  Musculo: Denies symptoms other than stated above  Skin: Denies symptoms other than stated above       Current Outpatient Medications:     terbinafine (LAMISIL) 1 % cream, Apply affected area topically 2 times daily. , Disp: 1 Tube, Rfl: 2    tamsulosin (FLOMAX) 0.4 MG capsule, take 1 capsule by mouth twice a day, Disp: 180 capsule, Rfl: 1    sertraline (ZOLOFT) 50 MG tablet, Take 1 tablet by mouth daily, Disp: 90 tablet, Rfl: 1    hydrALAZINE (APRESOLINE) 10 MG tablet, Take 1 tablet by mouth 2 times daily, Disp: 180 tablet, Rfl: 1    levETIRAcetam (KEPPRA) 500 MG tablet, Take 1 tablet by mouth 2 times daily, Disp: 60 tablet, Rfl: 4    atorvastatin (LIPITOR) 40 MG tablet, Take 1 tablet by mouth daily Indications: M-W-f only, Disp: 90 tablet, Rfl: 1    amLODIPine (NORVASC) 10 MG tablet, 1 tablet by PEG Tube route daily, Disp: 90 tablet, Rfl: 1    betamethasone dipropionate (DIPROLENE) 0.05 % cream, Apply to affected area topically 2 times daily. , Disp: 1 Tube, Rfl: 2    terbinafine (LAMISIL) 1 % cream, Apply affected area topically 2 times daily. , Disp: 1 Tube, Rfl: 2    Multiple Vitamins-Minerals (OCUVITE ADULT FORMULA) CAPS, Take by mouth daily, Disp: , Rfl:     Coenzyme Q10 (CO Q 10) 100 MG CAPS, Take by mouth, Disp: , Rfl:     docusate sodium (RA COL-RITE) 100 MG capsule, Take 100 mg by mouth 2 times daily, Disp: , Rfl:     ferrous sulfate 325 (65 FE) MG tablet, Take 325 mg by mouth daily (with breakfast), Disp: , Rfl:   Allergies   Allergen Reactions    Pcn [Penicillins]        Past Medical History:   Diagnosis Date    Atrial fibrillation ([facility] [address]     Blood transfusion reaction     past week(5/5/2014)    Hyperlipidemia     Hypertension     Stroke ([facility] [address] 07/31/2016           There were no vitals filed for this visit. Work History/Social History:  [place] [employer]    Focused Lower Extremity Physical Exam:    Neurovascular examination:    Dorsalis Pedis palpable bilateral.  Posterior tibialis palpable bilateral.    Capillary Refill Time:  Immediate return  Hair growth:  Symmetrical and bilateral   Skin:  Not atrophic  Edema: No edema bilateral feet or ankles. Neurologic:  Light touch intact bilateral.      Musculoskeletal/ Orthopedic examination:    Equinis: Absent bilateral  Dorsiflexion, plantarflexion, inversion, eversion bilateral 5 out of 5 muscle strength  Wiggling toes  Negative Homans  No pain bilateral heels today. Dermatology examination:      Bilateral lower legs with decreased erythematous skin. No open lesions. Toenails 1-5 right and left thickened, elongated, dystrophic, mycotic with subungual debris. Webspaces 1-4 bilateral clean, dry and intact. Hyperkeratotic tissue  plantar first and fifth metatarsal heads bilateral  No open wounds. No erythema. Hyperkeratotic tissue plantar central right heel. No open wound. Assessment and Plan:  Nacho Warner was seen today for callouses and nail problem. Diagnoses and all orders for this visit:    Tinea unguium    Corns and callosities    Tinea pedis of both feet    Xerosis cutis    Hereditary sensory neuropathy              Follow-up foot ankle exam  Manual debridement with standard technique toenails 1 through 5 right and left in thickness and length. Patient tolerated procedure well. Paring of hyperkeratotic tissue with #15 blade plantar first and fifth metatarsal head bilateral.    I did also pare hyperkeratotic tissue plantar left heel with #15 blade. Does present with his wife.   Continue lotion daily. Avoid barefoot. Follow-up 2 months. Return in about 2 months (around 5/1/2022). Seen By:  Laurel Sarmiento DPM      Document was created using voice recognition software. Note was reviewed, however may contain grammatical errors.

## 2022-05-03 ENCOUNTER — PROCEDURE VISIT (OUTPATIENT)
Dept: PODIATRY | Age: 82
End: 2022-05-03
Payer: MEDICARE

## 2022-05-03 VITALS — HEIGHT: 72 IN | BODY MASS INDEX: 25.06 KG/M2 | WEIGHT: 185 LBS

## 2022-05-03 DIAGNOSIS — L84 CORNS AND CALLOSITIES: ICD-10-CM

## 2022-05-03 DIAGNOSIS — B35.1 TINEA UNGUIUM: Primary | ICD-10-CM

## 2022-05-03 DIAGNOSIS — L85.3 XEROSIS CUTIS: ICD-10-CM

## 2022-05-03 DIAGNOSIS — B35.3 TINEA PEDIS OF BOTH FEET: ICD-10-CM

## 2022-05-03 DIAGNOSIS — G60.8 HEREDITARY SENSORY NEUROPATHY: ICD-10-CM

## 2022-05-03 PROCEDURE — 11721 DEBRIDE NAIL 6 OR MORE: CPT | Performed by: PODIATRIST

## 2022-05-03 PROCEDURE — 11056 PARNG/CUTG B9 HYPRKR LES 2-4: CPT | Performed by: PODIATRIST

## 2022-05-03 NOTE — PROGRESS NOTES
Katherin Gould is here today for nail care. his PCP is Fatuma Quinones DO last OV was 03/28/2022. CC:  Follow-up toenails 1-5 right and left. HPI:   Follow-up foot ankle exam  Follow-up elongated toenails 1 through 5 right and left. Denies any foot or ankle pain today. Still having callus tissue both feet. Denies any redness or itching of his feet. Does present with his wife. No additional pedal complaints today. ROS:  Const: Denies constitutional symptoms  Musculo: Denies symptoms other than stated above  Skin: Denies symptoms other than stated above       Current Outpatient Medications:     terbinafine (LAMISIL) 1 % cream, Apply affected area topically 2 times daily. , Disp: 1 Tube, Rfl: 2    tamsulosin (FLOMAX) 0.4 MG capsule, take 1 capsule by mouth twice a day, Disp: 180 capsule, Rfl: 1    sertraline (ZOLOFT) 50 MG tablet, Take 1 tablet by mouth daily, Disp: 90 tablet, Rfl: 1    hydrALAZINE (APRESOLINE) 10 MG tablet, Take 1 tablet by mouth 2 times daily, Disp: 180 tablet, Rfl: 1    levETIRAcetam (KEPPRA) 500 MG tablet, Take 1 tablet by mouth 2 times daily, Disp: 60 tablet, Rfl: 4    atorvastatin (LIPITOR) 40 MG tablet, Take 1 tablet by mouth daily Indications: M-W-f only, Disp: 90 tablet, Rfl: 1    amLODIPine (NORVASC) 10 MG tablet, 1 tablet by PEG Tube route daily, Disp: 90 tablet, Rfl: 1    betamethasone dipropionate (DIPROLENE) 0.05 % cream, Apply to affected area topically 2 times daily. , Disp: 1 Tube, Rfl: 2    terbinafine (LAMISIL) 1 % cream, Apply affected area topically 2 times daily. , Disp: 1 Tube, Rfl: 2    Multiple Vitamins-Minerals (OCUVITE ADULT FORMULA) CAPS, Take by mouth daily, Disp: , Rfl:     Coenzyme Q10 (CO Q 10) 100 MG CAPS, Take by mouth, Disp: , Rfl:     docusate sodium (RA COL-RITE) 100 MG capsule, Take 100 mg by mouth 2 times daily, Disp: , Rfl:     ferrous sulfate 325 (65 FE) MG tablet, Take 325 mg by mouth daily (with breakfast), Disp: , Rfl: Allergies   Allergen Reactions    Pcn [Penicillins]        Past Medical History:   Diagnosis Date    Atrial fibrillation ([facility] [address]     Blood transfusion reaction     past week(5/5/2014)    Hyperlipidemia     Hypertension     Stroke ([facility] [address] 07/31/2016           Vitals:    05/03/22 1025   Weight: 185 lb (83.9 kg)   Height: 6' (1.829 m)       Work History/Social History:  [place] [employer]    Focused Lower Extremity Physical Exam:    Neurovascular examination:    Dorsalis Pedis palpable bilateral.  Posterior tibialis weakly palpable bilateral.    Capillary Refill Time:  Immediate return  Hair growth:  Symmetrical and bilateral   Skin:  Not atrophic  Edema: Mild edema bilateral feet or ankles. Neurologic:  Light touch diminished bilateral.      Musculoskeletal/ Orthopedic examination:    Equinis: Absent bilateral  Dorsiflexion, plantarflexion, inversion, eversion bilateral 5 out of 5 muscle strength  Wiggling toes  Negative Homans  No pain plantar left heel. Dermatology examination:    Toenails 1-5 right and left thickened, elongated, dystrophic, mycotic with subungual debris. Webspaces 1-4 bilateral clean, dry and intact. Hyperkeratotic tissue  plantar first and fifth metatarsal heads bilateral  There is hyperkeratotic tissue plantar central left heel. No open wounds. Assessment and Plan:  Yakelin Weir was seen today for callouses and nail problem. Diagnoses and all orders for this visit:    Tinea unguium    Corns and callosities    Tinea pedis of both feet    Hereditary sensory neuropathy    Xerosis cutis            Follow-up foot ankle exam  Manual debridement with standard technique toenails 1 through 5 right and left in thickness and length. Patient tolerated procedure well. Paring of hyperkeratotic tissue with #15 blade plantar first and fifth metatarsal head bilateral.    I did pare hyperkeratotic tissue plantar left heel with #15 blade. Continue wide well supported walking shoes. Avoid barefoot. Wife is present throughout entire visit today. I will follow-up in office 2 months              Return in about 2 months (around 7/3/2022). Seen By:  Alysha Navarro DPM      Document was created using voice recognition software. Note was reviewed, however may contain grammatical errors.

## 2022-07-05 ENCOUNTER — PROCEDURE VISIT (OUTPATIENT)
Dept: PODIATRY | Age: 82
End: 2022-07-05
Payer: MEDICARE

## 2022-07-05 VITALS — WEIGHT: 185 LBS | HEIGHT: 72 IN | BODY MASS INDEX: 25.06 KG/M2

## 2022-07-05 DIAGNOSIS — G60.8 HEREDITARY SENSORY NEUROPATHY: ICD-10-CM

## 2022-07-05 DIAGNOSIS — B35.3 TINEA PEDIS OF BOTH FEET: ICD-10-CM

## 2022-07-05 DIAGNOSIS — L85.3 XEROSIS CUTIS: ICD-10-CM

## 2022-07-05 DIAGNOSIS — B35.1 TINEA UNGUIUM: Primary | ICD-10-CM

## 2022-07-05 DIAGNOSIS — L84 CORNS AND CALLOSITIES: ICD-10-CM

## 2022-07-05 PROCEDURE — 11721 DEBRIDE NAIL 6 OR MORE: CPT | Performed by: PODIATRIST

## 2022-07-05 PROCEDURE — 11056 PARNG/CUTG B9 HYPRKR LES 2-4: CPT | Performed by: PODIATRIST

## 2022-07-05 NOTE — PROGRESS NOTES
Rudolph Coello is here today for nail care. his PCP is Corine Lerma DO last OV was 06/21/2022. CC:  Follow-up toenails 1-5 right and left. HPI:   Follow-up foot and ankle exam  Elongated toenails 1 through 5 both feet. Presents with wife. Callus tissue on the bottom of his heels. No recent injury no additional pedal complaints. No open wounds. ROS:  Const: Denies constitutional symptoms  Musculo: Denies symptoms other than stated above  Skin: Denies symptoms other than stated above       Current Outpatient Medications:     terbinafine (LAMISIL) 1 % cream, Apply affected area topically 2 times daily. , Disp: 1 Tube, Rfl: 2    tamsulosin (FLOMAX) 0.4 MG capsule, take 1 capsule by mouth twice a day, Disp: 180 capsule, Rfl: 1    sertraline (ZOLOFT) 50 MG tablet, Take 1 tablet by mouth daily, Disp: 90 tablet, Rfl: 1    hydrALAZINE (APRESOLINE) 10 MG tablet, Take 1 tablet by mouth 2 times daily, Disp: 180 tablet, Rfl: 1    levETIRAcetam (KEPPRA) 500 MG tablet, Take 1 tablet by mouth 2 times daily, Disp: 60 tablet, Rfl: 4    atorvastatin (LIPITOR) 40 MG tablet, Take 1 tablet by mouth daily Indications: M-W-f only, Disp: 90 tablet, Rfl: 1    amLODIPine (NORVASC) 10 MG tablet, 1 tablet by PEG Tube route daily, Disp: 90 tablet, Rfl: 1    betamethasone dipropionate (DIPROLENE) 0.05 % cream, Apply to affected area topically 2 times daily. , Disp: 1 Tube, Rfl: 2    terbinafine (LAMISIL) 1 % cream, Apply affected area topically 2 times daily. , Disp: 1 Tube, Rfl: 2    Multiple Vitamins-Minerals (OCUVITE ADULT FORMULA) CAPS, Take by mouth daily, Disp: , Rfl:     Coenzyme Q10 (CO Q 10) 100 MG CAPS, Take by mouth, Disp: , Rfl:     docusate sodium (RA COL-RITE) 100 MG capsule, Take 100 mg by mouth 2 times daily, Disp: , Rfl:     ferrous sulfate 325 (65 FE) MG tablet, Take 325 mg by mouth daily (with breakfast), Disp: , Rfl:   Allergies   Allergen Reactions    Pcn [Penicillins] Past Medical History:   Diagnosis Date    Atrial fibrillation ([facility] [address]     Blood transfusion reaction     past week(5/5/2014)    Hyperlipidemia     Hypertension     Stroke ([facility] [address] 07/31/2016           Vitals:    07/05/22 1004   Weight: 185 lb (83.9 kg)   Height: 6' (1.829 m)       Work History/Social History:  [place] [employer]    Focused Lower Extremity Physical Exam:    Neurovascular examination:    Dorsalis Pedis palpable bilateral.  Posterior tibialis weakly palpable bilateral.    Capillary Refill Time:  Immediate return  Hair growth:  Symmetrical and bilateral   Skin:  Not atrophic  Edema: Mild edema bilateral feet or ankles. Neurologic:  Light touch diminished bilateral.      Musculoskeletal/ Orthopedic examination:    Equinis: Absent bilateral  Dorsiflexion, plantarflexion, inversion, eversion bilateral 5 out of 5 muscle strength  Wiggling toes  Negative Homans  No pain foot or ankle. Dermatology examination:    Toenails 1-5 right and left thickened, elongated, dystrophic, mycotic with subungual debris. Webspaces 1-4 bilateral clean, dry and intact. Hyperkeratotic tissue plantar heels bilateral.    No open skin lesions or abrasions. No erythema. Assessment and Plan:  Vadim Diaz was seen today for callouses and nail problem. Diagnoses and all orders for this visit:    Tinea unguium    Corns and callosities    Tinea pedis of both feet    Hereditary sensory neuropathy    Xerosis cutis            Follow-up foot and ankle exam  Manual debridement with standard technique toenails 1 through 5 right and left in thickness and length. Patient tolerated procedure well. I did pare hyperkeratotic tissue bilateral heels with #15 blade. Avoid barefoot. Lotion daily. I will follow-up in office 2 months to monitor progression. Return in about 2 months (around 9/5/2022). Seen By:  Pennie Kerr DPM      Document was created using voice recognition software.   Note was reviewed, however may contain grammatical errors.

## 2022-09-06 ENCOUNTER — PROCEDURE VISIT (OUTPATIENT)
Dept: PODIATRY | Age: 82
End: 2022-09-06
Payer: MEDICARE

## 2022-09-06 VITALS — BODY MASS INDEX: 25.06 KG/M2 | HEIGHT: 72 IN | WEIGHT: 185 LBS

## 2022-09-06 DIAGNOSIS — G60.8 HEREDITARY SENSORY NEUROPATHY: ICD-10-CM

## 2022-09-06 DIAGNOSIS — L84 CORNS AND CALLOSITIES: ICD-10-CM

## 2022-09-06 DIAGNOSIS — B35.3 TINEA PEDIS OF BOTH FEET: ICD-10-CM

## 2022-09-06 DIAGNOSIS — L85.3 XEROSIS CUTIS: ICD-10-CM

## 2022-09-06 DIAGNOSIS — B35.1 TINEA UNGUIUM: Primary | ICD-10-CM

## 2022-09-06 PROCEDURE — 11056 PARNG/CUTG B9 HYPRKR LES 2-4: CPT | Performed by: PODIATRIST

## 2022-09-06 PROCEDURE — 11721 DEBRIDE NAIL 6 OR MORE: CPT | Performed by: PODIATRIST

## 2022-09-06 NOTE — PROGRESS NOTES
Rudolph Coello is here today for nail care. his PCP is Mala Johnson DO last OV was 06/21/2022. CC:  Follow-up toenails 1-5 right and left. HPI:   Follow-up foot and ankle exam.  Elongated toenails both feet. Callus tissue mostly on the bottom of the left heel. No open wounds. No additional pedal complaints today. Does present today with his wife. ROS:  Const: Denies constitutional symptoms  Musculo: Denies symptoms other than stated above  Skin: Denies symptoms other than stated above       Current Outpatient Medications:     terbinafine (LAMISIL) 1 % cream, Apply affected area topically 2 times daily. , Disp: 1 Tube, Rfl: 2    tamsulosin (FLOMAX) 0.4 MG capsule, take 1 capsule by mouth twice a day, Disp: 180 capsule, Rfl: 1    sertraline (ZOLOFT) 50 MG tablet, Take 1 tablet by mouth daily, Disp: 90 tablet, Rfl: 1    hydrALAZINE (APRESOLINE) 10 MG tablet, Take 1 tablet by mouth 2 times daily, Disp: 180 tablet, Rfl: 1    levETIRAcetam (KEPPRA) 500 MG tablet, Take 1 tablet by mouth 2 times daily, Disp: 60 tablet, Rfl: 4    atorvastatin (LIPITOR) 40 MG tablet, Take 1 tablet by mouth daily Indications: M-W-f only, Disp: 90 tablet, Rfl: 1    amLODIPine (NORVASC) 10 MG tablet, 1 tablet by PEG Tube route daily, Disp: 90 tablet, Rfl: 1    betamethasone dipropionate (DIPROLENE) 0.05 % cream, Apply to affected area topically 2 times daily. , Disp: 1 Tube, Rfl: 2    terbinafine (LAMISIL) 1 % cream, Apply affected area topically 2 times daily. , Disp: 1 Tube, Rfl: 2    Multiple Vitamins-Minerals (OCUVITE ADULT FORMULA) CAPS, Take by mouth daily, Disp: , Rfl:     Coenzyme Q10 (CO Q 10) 100 MG CAPS, Take by mouth, Disp: , Rfl:     docusate sodium (RA COL-RITE) 100 MG capsule, Take 100 mg by mouth 2 times daily, Disp: , Rfl:     ferrous sulfate 325 (65 FE) MG tablet, Take 325 mg by mouth daily (with breakfast), Disp: , Rfl:   Allergies   Allergen Reactions    Pcn [Penicillins]        Past Medical History:   Diagnosis Date    Atrial fibrillation ([facility] [address]     Blood transfusion reaction     past week(5/5/2014)    Hyperlipidemia     Hypertension     Stroke ([facility] [address] 07/31/2016           Vitals:    09/06/22 1002   Weight: 185 lb (83.9 kg)   Height: 6' (1.829 m)       Work History/Social History:  [place] [employer]    Focused Lower Extremity Physical Exam:    Neurovascular examination:    Dorsalis Pedis palpable bilateral.  Posterior tibialis weakly palpable bilateral.    Capillary Refill Time:  Immediate return  Hair growth:  Symmetrical and bilateral   Skin:  Not atrophic  Edema: Mild edema bilateral feet or ankles. Neurologic:  Light touch diminished bilateral.      Musculoskeletal/ Orthopedic examination:    Equinis: Absent bilateral  Dorsiflexion, plantarflexion, inversion, eversion bilateral 5 out of 5 muscle strength  Wiggling toes  Negative Homans. No pain foot or ankle. Dermatology examination:    Toenails 1-5 right and left thickened, elongated, dystrophic, mycotic with subungual debris. Webspaces 1-4 bilateral clean, dry and intact. Hyperkeratotic tissue plantar heels bilateral.        Assessment and Plan:  Luis A Graves was seen today for callouses and nail problem. Diagnoses and all orders for this visit:    Tinea unguium    Corns and callosities    Tinea pedis of both feet    Xerosis cutis    Hereditary sensory neuropathy          Follow-up foot and ankle exam  Manual debridement with standard technique toenails 1 through 5 right and left in thickness and length. Patient tolerated procedure well. Paring hyperkeratotic tissue plantar heels bilateral with #15 blade. Avoid barefoot. Lotion daily with feet. I will follow-up in office 2 months                      Return in about 2 months (around 11/6/2022). Seen By:  Jeremiah Fu DPM      Document was created using voice recognition software. Note was reviewed, however may contain grammatical errors.

## 2022-11-29 ENCOUNTER — OFFICE VISIT (OUTPATIENT)
Dept: PODIATRY | Age: 82
End: 2022-11-29
Payer: MEDICARE

## 2022-11-29 DIAGNOSIS — L84 CORNS AND CALLOSITIES: ICD-10-CM

## 2022-11-29 DIAGNOSIS — B35.3 TINEA PEDIS OF BOTH FEET: ICD-10-CM

## 2022-11-29 DIAGNOSIS — L85.3 XEROSIS CUTIS: ICD-10-CM

## 2022-11-29 DIAGNOSIS — B35.1 TINEA UNGUIUM: Primary | ICD-10-CM

## 2022-11-29 DIAGNOSIS — G60.8 HEREDITARY SENSORY NEUROPATHY: ICD-10-CM

## 2022-11-29 PROCEDURE — 99999 PR OFFICE/OUTPT VISIT,PROCEDURE ONLY: CPT | Performed by: PODIATRIST

## 2022-11-29 PROCEDURE — 11721 DEBRIDE NAIL 6 OR MORE: CPT | Performed by: PODIATRIST

## 2022-11-29 PROCEDURE — 11056 PARNG/CUTG B9 HYPRKR LES 2-4: CPT | Performed by: PODIATRIST

## 2022-11-29 RX ORDER — AMMONIUM LACTATE 12 G/100G
LOTION TOPICAL
Qty: 400 G | Refills: 2 | Status: SHIPPED | OUTPATIENT
Start: 2022-11-29

## 2022-11-29 RX ORDER — PRENATAL VIT 91/IRON/FOLIC/DHA 28-975-200
COMBINATION PACKAGE (EA) ORAL
Qty: 42 G | Refills: 2 | Status: SHIPPED | OUTPATIENT
Start: 2022-11-29

## 2022-11-29 NOTE — PROGRESS NOTES
Niraj Conn is here today for nail care. his PCP is Billy Gray DO last OV was 09/14/2022. CC:  Follow-up toenails 1-5 right and left. HPI:   Follow-up foot and ankle exam  Presents with his wife. Does have some dryness both feet. No additional pedal complaints. ROS:  Const: Denies constitutional symptoms  Musculo: Denies symptoms other than stated above  Skin: Denies symptoms other than stated above       Current Outpatient Medications:     ammonium lactate (LAC-HYDRIN) 12 % lotion, Apply topically twice daily, Disp: 400 g, Rfl: 2    terbinafine (LAMISIL) 1 % cream, Apply affected area topically 2 times daily. , Disp: 42 g, Rfl: 2    terbinafine (LAMISIL) 1 % cream, Apply affected area topically 2 times daily. , Disp: 1 Tube, Rfl: 2    tamsulosin (FLOMAX) 0.4 MG capsule, take 1 capsule by mouth twice a day, Disp: 180 capsule, Rfl: 1    sertraline (ZOLOFT) 50 MG tablet, Take 1 tablet by mouth daily, Disp: 90 tablet, Rfl: 1    hydrALAZINE (APRESOLINE) 10 MG tablet, Take 1 tablet by mouth 2 times daily, Disp: 180 tablet, Rfl: 1    levETIRAcetam (KEPPRA) 500 MG tablet, Take 1 tablet by mouth 2 times daily, Disp: 60 tablet, Rfl: 4    atorvastatin (LIPITOR) 40 MG tablet, Take 1 tablet by mouth daily Indications: M-W-f only, Disp: 90 tablet, Rfl: 1    amLODIPine (NORVASC) 10 MG tablet, 1 tablet by PEG Tube route daily, Disp: 90 tablet, Rfl: 1    betamethasone dipropionate (DIPROLENE) 0.05 % cream, Apply to affected area topically 2 times daily. , Disp: 1 Tube, Rfl: 2    terbinafine (LAMISIL) 1 % cream, Apply affected area topically 2 times daily. , Disp: 1 Tube, Rfl: 2    Multiple Vitamins-Minerals (OCUVITE ADULT FORMULA) CAPS, Take by mouth daily, Disp: , Rfl:     Coenzyme Q10 (CO Q 10) 100 MG CAPS, Take by mouth, Disp: , Rfl:     docusate sodium (RA COL-RITE) 100 MG capsule, Take 100 mg by mouth 2 times daily, Disp: , Rfl:     ferrous sulfate 325 (65 FE) MG tablet, Take 325 mg by mouth daily (with breakfast), Disp: , Rfl:   Allergies   Allergen Reactions    Pcn [Penicillins]        Past Medical History:   Diagnosis Date    Atrial fibrillation ([facility] [address]     Blood transfusion reaction     past week(5/5/2014)    Hyperlipidemia     Hypertension     Stroke ([facility] 07/31/2016           There were no vitals filed for this visit. Work History/Social History:  [place] [employer]    Focused Lower Extremity Physical Exam:    Neurovascular examination:    Dorsalis Pedis palpable bilateral.  Posterior tibialis weakly palpable bilateral.    Capillary Refill Time:  Immediate return  Hair growth:  Symmetrical and bilateral   Skin:  Not atrophic  Edema: Mild edema bilateral feet or ankles. Neurologic:  Light touch diminished bilateral.      Musculoskeletal/ Orthopedic examination:    Equinis: Absent bilateral  Dorsiflexion, plantarflexion, inversion, eversion bilateral 5 out of 5 muscle strength  Wiggling toes  Negative Homans. No pain foot or ankle. Dermatology examination:    Toenails 1-5 right and left thickened, elongated, dystrophic, mycotic with subungual debris. Webspaces 1-4 bilateral clean, dry and intact. Hyperkeratotic tissue plantar heels bilateral.  No open wounds  Dry skin plantar feet bilateral.  [place] distribution fourth webspace left foot. Assessment and Plan:  Lydia Mobley was seen today for callouses and nail problem. Diagnoses and all orders for this visit:    Tinea unguium    Corns and callosities    Tinea pedis of both feet    Xerosis cutis    Hereditary sensory neuropathy    Other orders  -     ammonium lactate (LAC-HYDRIN) 12 % lotion; Apply topically twice daily  -     terbinafine (LAMISIL) 1 % cream; Apply affected area topically 2 times daily. Follow-up foot and ankle exam  Manual debridement with standard technique toenails 1 through 5 right and left in thickness and length. Patient tolerated procedure well.   Paring hyperkeratotic tissue plantar heels bilateral 15 blade.  Lotion daily. Avoid barefoot. Ammonium lactate 12% twice daily. Lamisil 1% once daily on top of the left foot and bottom left foot. Does have tinea pedis fourth webspace with no open wounds. Follow-up 2 months. Return in about 2 months (around 1/29/2023). Seen By:  Ac Robledo DPM      Document was created using voice recognition software. Note was reviewed, however may contain grammatical errors.

## 2023-01-31 ENCOUNTER — PROCEDURE VISIT (OUTPATIENT)
Dept: PODIATRY | Age: 83
End: 2023-01-31
Payer: MEDICARE

## 2023-01-31 DIAGNOSIS — L85.3 XEROSIS CUTIS: ICD-10-CM

## 2023-01-31 DIAGNOSIS — L84 CORNS AND CALLOSITIES: ICD-10-CM

## 2023-01-31 DIAGNOSIS — B35.3 TINEA PEDIS OF BOTH FEET: ICD-10-CM

## 2023-01-31 DIAGNOSIS — G60.8 HEREDITARY SENSORY NEUROPATHY: ICD-10-CM

## 2023-01-31 DIAGNOSIS — B35.1 TINEA UNGUIUM: Primary | ICD-10-CM

## 2023-01-31 DIAGNOSIS — L24.89 IRRITANT CONTACT DERMATITIS DUE TO OTHER AGENTS: ICD-10-CM

## 2023-01-31 PROCEDURE — 11056 PARNG/CUTG B9 HYPRKR LES 2-4: CPT | Performed by: PODIATRIST

## 2023-01-31 PROCEDURE — 11721 DEBRIDE NAIL 6 OR MORE: CPT | Performed by: PODIATRIST

## 2023-01-31 RX ORDER — BETAMETHASONE DIPROPIONATE 0.5 MG/G
CREAM TOPICAL
Qty: 45 G | Refills: 2 | Status: SHIPPED | OUTPATIENT
Start: 2023-01-31

## 2023-01-31 NOTE — PROGRESS NOTES
Rashid Keating is here today for nail care. his PCP is Damien Wadsworth DO last OV was 12/20/2022. CC:  Follow-up toenails 1-5 right and left. HPI:   Follow-up elongated toenails 1-5 right left. Presents with wife. No open wounds. Does have callus tissue mostly on the bottom the left heel. Denies recent foot or ankle issues. Still does have occasional itching on the front of the left ankle. No open wounds. Has not been using topical Lamisil as he did recently run out. States he did not notice much improvement with Lamisil. ROS:  Const: Denies constitutional symptoms  Musculo: Denies symptoms other than stated above  Skin: Denies symptoms other than stated above       Current Outpatient Medications:     betamethasone dipropionate 0.05 % cream, Apply to affected area topically 2 times daily. , Disp: 45 g, Rfl: 2    ammonium lactate (LAC-HYDRIN) 12 % lotion, Apply topically twice daily, Disp: 400 g, Rfl: 2    terbinafine (LAMISIL) 1 % cream, Apply affected area topically 2 times daily. , Disp: 42 g, Rfl: 2    terbinafine (LAMISIL) 1 % cream, Apply affected area topically 2 times daily. , Disp: 1 Tube, Rfl: 2    tamsulosin (FLOMAX) 0.4 MG capsule, take 1 capsule by mouth twice a day, Disp: 180 capsule, Rfl: 1    sertraline (ZOLOFT) 50 MG tablet, Take 1 tablet by mouth daily, Disp: 90 tablet, Rfl: 1    hydrALAZINE (APRESOLINE) 10 MG tablet, Take 1 tablet by mouth 2 times daily, Disp: 180 tablet, Rfl: 1    levETIRAcetam (KEPPRA) 500 MG tablet, Take 1 tablet by mouth 2 times daily, Disp: 60 tablet, Rfl: 4    atorvastatin (LIPITOR) 40 MG tablet, Take 1 tablet by mouth daily Indications: M-W-f only, Disp: 90 tablet, Rfl: 1    amLODIPine (NORVASC) 10 MG tablet, 1 tablet by PEG Tube route daily, Disp: 90 tablet, Rfl: 1    betamethasone dipropionate (DIPROLENE) 0.05 % cream, Apply to affected area topically 2 times daily. , Disp: 1 Tube, Rfl: 2    terbinafine (LAMISIL) 1 % cream, Apply affected area topically 2 times daily. , Disp: 1 Tube, Rfl: 2    Multiple Vitamins-Minerals (OCUVITE ADULT FORMULA) CAPS, Take by mouth daily, Disp: , Rfl:     Coenzyme Q10 (CO Q 10) 100 MG CAPS, Take by mouth, Disp: , Rfl:     docusate sodium (RA COL-RITE) 100 MG capsule, Take 100 mg by mouth 2 times daily, Disp: , Rfl:     ferrous sulfate 325 (65 FE) MG tablet, Take 325 mg by mouth daily (with breakfast), Disp: , Rfl:   Allergies   Allergen Reactions    Pcn [Penicillins]        Past Medical History:   Diagnosis Date    Atrial fibrillation ([facility] [address]     Blood transfusion reaction     past week(5/5/2014)    Hyperlipidemia     Hypertension     Stroke ([facility] 07/31/2016           There were no vitals filed for this visit. Work History/Social History:  [place] [employer]    Focused Lower Extremity Physical Exam:    Neurovascular examination:    Dorsalis Pedis palpable bilateral.  Posterior tibialis weakly palpable bilateral.    Capillary Refill Time:  Immediate return  Hair growth:  Symmetrical and bilateral   Skin:  Not atrophic  Edema: Mild edema bilateral feet or ankles. Neurologic:  Light touch diminished bilateral.      Musculoskeletal/ Orthopedic examination:    Equinis: Absent bilateral  Dorsiflexion, plantarflexion, inversion, eversion bilateral 5 out of 5 muscle strength  Wiggling toes  Negative Homans. Dermatology examination:    Toenails 1-5 right and left thickened, elongated, dystrophic, mycotic with subungual debris. Webspaces 1-4 bilateral clean, dry and intact. Hyperkeratotic tissue plantar heels bilateral.  No open wounds. There is mild contact dermatitis anterior left ankle. No open wounds    Assessment and Plan:  Lisa Wright was seen today for callouses and nail problem.     Diagnoses and all orders for this visit:    Tinea unguium    Corns and callosities    Tinea pedis of both feet    Xerosis cutis    Hereditary sensory neuropathy    Irritant contact dermatitis due to other agents    Other orders  - betamethasone dipropionate 0.05 % cream; Apply to affected area topically 2 times daily. Follow-up foot and ankle exam    Manual debridement with standard technique toenails 1 through 5 right and left in thickness and length. Patient tolerated procedure well. Paring hyperkeratotic tissue plantar heels bilateral with #15 blade. Avoid barefoot  I did recommend lotion daily both feet. We still did recommend applying topical betamethasone 0.05% cream twice daily at the front of the left ankle. Likely contact dermatitis. I will follow-up 2 months          Return in about 2 months (around 3/31/2023). Seen By:  Diamante Hughes DPM      Document was created using voice recognition software. Note was reviewed, however may contain grammatical errors.

## 2023-04-04 ENCOUNTER — PROCEDURE VISIT (OUTPATIENT)
Dept: PODIATRY | Age: 83
End: 2023-04-04
Payer: MEDICARE

## 2023-04-04 DIAGNOSIS — G60.8 HEREDITARY SENSORY NEUROPATHY: ICD-10-CM

## 2023-04-04 DIAGNOSIS — B35.3 TINEA PEDIS OF BOTH FEET: ICD-10-CM

## 2023-04-04 DIAGNOSIS — B35.1 TINEA UNGUIUM: Primary | ICD-10-CM

## 2023-04-04 DIAGNOSIS — L85.3 XEROSIS CUTIS: ICD-10-CM

## 2023-04-04 DIAGNOSIS — L84 CORNS AND CALLOSITIES: ICD-10-CM

## 2023-04-04 PROCEDURE — 11721 DEBRIDE NAIL 6 OR MORE: CPT | Performed by: PODIATRIST

## 2023-04-04 PROCEDURE — 11056 PARNG/CUTG B9 HYPRKR LES 2-4: CPT | Performed by: PODIATRIST

## 2023-04-04 NOTE — PROGRESS NOTES
6/4/2023). Seen By:  [place] Hyman DPM      Document was created using voice recognition software. Note was reviewed, however may contain grammatical errors.

## 2023-06-20 ENCOUNTER — PROCEDURE VISIT (OUTPATIENT)
Dept: PODIATRY | Age: 83
End: 2023-06-20
Payer: MEDICARE

## 2023-06-20 VITALS — BODY MASS INDEX: 25.06 KG/M2 | HEIGHT: 72 IN | WEIGHT: 185 LBS

## 2023-06-20 DIAGNOSIS — B35.1 TINEA UNGUIUM: Primary | ICD-10-CM

## 2023-06-20 DIAGNOSIS — G60.8 HEREDITARY SENSORY NEUROPATHY: ICD-10-CM

## 2023-06-20 DIAGNOSIS — L85.3 XEROSIS CUTIS: ICD-10-CM

## 2023-06-20 DIAGNOSIS — L84 CORNS AND CALLOSITIES: ICD-10-CM

## 2023-06-20 DIAGNOSIS — B35.3 TINEA PEDIS OF BOTH FEET: ICD-10-CM

## 2023-06-20 PROCEDURE — 11056 PARNG/CUTG B9 HYPRKR LES 2-4: CPT | Performed by: PODIATRIST

## 2023-06-20 PROCEDURE — 11721 DEBRIDE NAIL 6 OR MORE: CPT | Performed by: PODIATRIST

## 2023-06-20 NOTE — PROGRESS NOTES
Patient in today for nail care. Patient does not have any complaints of pain at this time. Patient's PCP is Jessica Stallings DO date of last ov 03/20/2023. Edwin Sandhu LPN       CC:  Follow-up toenails 1-5 right and left. HPI:   Follow-up foot ankle exam.  Elongated toenails both feet. No open wounds. No additional pedal complaints. ROS:  Const: Denies constitutional symptoms  Musculo: Denies symptoms other than stated above  Skin: Denies symptoms other than stated above       Current Outpatient Medications:     betamethasone dipropionate 0.05 % cream, Apply to affected area topically 2 times daily. , Disp: 45 g, Rfl: 2    ammonium lactate (LAC-HYDRIN) 12 % lotion, Apply topically twice daily, Disp: 400 g, Rfl: 2    terbinafine (LAMISIL) 1 % cream, Apply affected area topically 2 times daily. , Disp: 42 g, Rfl: 2    terbinafine (LAMISIL) 1 % cream, Apply affected area topically 2 times daily. , Disp: 1 Tube, Rfl: 2    tamsulosin (FLOMAX) 0.4 MG capsule, take 1 capsule by mouth twice a day, Disp: 180 capsule, Rfl: 1    sertraline (ZOLOFT) 50 MG tablet, Take 1 tablet by mouth daily, Disp: 90 tablet, Rfl: 1    hydrALAZINE (APRESOLINE) 10 MG tablet, Take 1 tablet by mouth 2 times daily, Disp: 180 tablet, Rfl: 1    levETIRAcetam (KEPPRA) 500 MG tablet, Take 1 tablet by mouth 2 times daily, Disp: 60 tablet, Rfl: 4    atorvastatin (LIPITOR) 40 MG tablet, Take 1 tablet by mouth daily Indications: M-W-f only, Disp: 90 tablet, Rfl: 1    amLODIPine (NORVASC) 10 MG tablet, 1 tablet by PEG Tube route daily, Disp: 90 tablet, Rfl: 1    betamethasone dipropionate (DIPROLENE) 0.05 % cream, Apply to affected area topically 2 times daily. , Disp: 1 Tube, Rfl: 2    terbinafine (LAMISIL) 1 % cream, Apply affected area topically 2 times daily. , Disp: 1 Tube, Rfl: 2    Multiple Vitamins-Minerals (OCUVITE ADULT FORMULA) CAPS, Take by mouth daily, Disp: , Rfl:     Coenzyme Q10 (CO Q 10) 100 MG CAPS, Take by mouth, Disp:

## 2023-09-19 ENCOUNTER — PROCEDURE VISIT (OUTPATIENT)
Dept: PODIATRY | Age: 83
End: 2023-09-19
Payer: MEDICARE

## 2023-09-19 VITALS — WEIGHT: 185 LBS | HEIGHT: 72 IN | BODY MASS INDEX: 25.06 KG/M2

## 2023-09-19 DIAGNOSIS — L84 CORNS AND CALLOSITIES: ICD-10-CM

## 2023-09-19 DIAGNOSIS — G60.8 HEREDITARY SENSORY NEUROPATHY: ICD-10-CM

## 2023-09-19 DIAGNOSIS — B35.3 TINEA PEDIS OF BOTH FEET: ICD-10-CM

## 2023-09-19 DIAGNOSIS — B35.1 TINEA UNGUIUM: Primary | ICD-10-CM

## 2023-09-19 DIAGNOSIS — L85.3 XEROSIS CUTIS: ICD-10-CM

## 2023-09-19 PROCEDURE — 11056 PARNG/CUTG B9 HYPRKR LES 2-4: CPT | Performed by: PODIATRIST

## 2023-09-19 PROCEDURE — 11721 DEBRIDE NAIL 6 OR MORE: CPT | Performed by: PODIATRIST

## 2023-09-19 NOTE — PROGRESS NOTES
months      Return in about 2 months (around 11/19/2023). Seen By:  Sarita Guy DPM      Document was created using voice recognition software. Note was reviewed, however may contain grammatical errors. Spironolactone Counseling: Patient advised regarding risks of diarrhea, abdominal pain, hyperkalemia, birth defects (for female patients), liver toxicity and renal toxicity. The patient may need blood work to monitor liver and kidney function and potassium levels while on therapy. The patient verbalized understanding of the proper use and possible adverse effects of spironolactone.  All of the patient's questions and concerns were addressed.

## 2023-11-28 ENCOUNTER — PROCEDURE VISIT (OUTPATIENT)
Dept: PODIATRY | Age: 83
End: 2023-11-28
Payer: MEDICARE

## 2023-11-28 VITALS — WEIGHT: 185 LBS | BODY MASS INDEX: 25.06 KG/M2 | HEIGHT: 72 IN

## 2023-11-28 DIAGNOSIS — B35.3 TINEA PEDIS OF BOTH FEET: ICD-10-CM

## 2023-11-28 DIAGNOSIS — L84 CORNS AND CALLOSITIES: ICD-10-CM

## 2023-11-28 DIAGNOSIS — G60.8 HEREDITARY SENSORY NEUROPATHY: ICD-10-CM

## 2023-11-28 DIAGNOSIS — L85.3 XEROSIS CUTIS: ICD-10-CM

## 2023-11-28 DIAGNOSIS — B35.1 TINEA UNGUIUM: Primary | ICD-10-CM

## 2023-11-28 PROCEDURE — 11721 DEBRIDE NAIL 6 OR MORE: CPT | Performed by: PODIATRIST

## 2023-11-28 PROCEDURE — 11056 PARNG/CUTG B9 HYPRKR LES 2-4: CPT | Performed by: PODIATRIST

## 2023-11-28 NOTE — PROGRESS NOTES
Rfl:     Coenzyme Q10 (CO Q 10) 100 MG CAPS, Take by mouth, Disp: , Rfl:     docusate sodium (COLACE) 100 MG capsule, Take 1 capsule by mouth 2 times daily, Disp: , Rfl:     ferrous sulfate 325 (65 FE) MG tablet, Take 1 tablet by mouth daily (with breakfast), Disp: , Rfl:   Allergies   Allergen Reactions    Pcn [Penicillins]        Past Medical History:   Diagnosis Date    Atrial fibrillation ([address])     Blood transfusion reaction     past week(5/5/2014)    Hyperlipidemia     Hypertension     Stroke ([address]) 07/31/2016           Vitals:    11/28/23 1500   Weight: 83.9 kg (185 lb)   Height: 1.829 m (6')         Focused Lower Extremity Physical Exam:    Neurovascular examination:    Dorsalis Pedis palpable bilateral.  Posterior tibialis weakly palpable bilateral.    Capillary Refill Time:  Immediate return  Hair growth:  Symmetrical and bilateral   Skin:  Not atrophic  Edema: Mild edema bilateral feet or ankles. Neurologic:  Light touch diminished bilateral.      Musculoskeletal/ Orthopedic examination:    Equinis: Absent bilateral  Dorsiflexion, plantarflexion, inversion, eversion bilateral 5 out of 5 muscle strength  Wiggling toes pain-free. Negative Homans. Dermatology examination:    Toenails 1-5 right and left thickened, elongated, dystrophic, mycotic with subungual debris. Webspaces 1-4 clean dry intact bilateral.   Hyperkeratotic tissue plantar heels bilateral    Assessment and Plan:  Reji Garcia was seen today for nail problem. Diagnoses and all orders for this visit:    Tinea unguium    Corns and callosities    Tinea pedis of both feet    Xerosis cutis    Hereditary sensory neuropathy        Follow-up tinea unguium  Manual debridement with standard technique toenails 1 through 5 right and left in thickness and length. Patient tolerated procedure well. Paring hyperkeratotic tissue plantar heels bilateral with #15 blade. Still did recommend lotion daily. Avoid barefoot.   Follow-up 2 months      Return in